# Patient Record
Sex: FEMALE | Race: OTHER | Employment: FULL TIME | URBAN - METROPOLITAN AREA
[De-identification: names, ages, dates, MRNs, and addresses within clinical notes are randomized per-mention and may not be internally consistent; named-entity substitution may affect disease eponyms.]

---

## 2019-04-04 ENCOUNTER — LAB ENCOUNTER (OUTPATIENT)
Dept: LAB | Age: 29
End: 2019-04-04
Attending: FAMILY MEDICINE
Payer: COMMERCIAL

## 2019-04-04 DIAGNOSIS — N92.0 MENORRHAGIA: ICD-10-CM

## 2019-04-04 DIAGNOSIS — Z00.00 ROUTINE GENERAL MEDICAL EXAMINATION AT A HEALTH CARE FACILITY: Primary | ICD-10-CM

## 2019-04-04 PROCEDURE — 36415 COLL VENOUS BLD VENIPUNCTURE: CPT

## 2019-04-04 PROCEDURE — 85025 COMPLETE CBC W/AUTO DIFF WBC: CPT

## 2019-04-04 PROCEDURE — 81001 URINALYSIS AUTO W/SCOPE: CPT

## 2019-04-04 PROCEDURE — 84443 ASSAY THYROID STIM HORMONE: CPT

## 2019-04-04 PROCEDURE — 80053 COMPREHEN METABOLIC PANEL: CPT

## 2019-05-02 ENCOUNTER — TELEPHONE (OUTPATIENT)
Dept: FAMILY MEDICINE CLINIC | Facility: CLINIC | Age: 29
End: 2019-05-02

## 2019-05-15 ENCOUNTER — TELEPHONE (OUTPATIENT)
Dept: OBGYN CLINIC | Facility: CLINIC | Age: 29
End: 2019-05-15

## 2019-05-15 ENCOUNTER — OFFICE VISIT (OUTPATIENT)
Dept: OBGYN CLINIC | Facility: CLINIC | Age: 29
End: 2019-05-15

## 2019-05-15 VITALS
WEIGHT: 120 LBS | BODY MASS INDEX: 22.08 KG/M2 | HEIGHT: 62 IN | SYSTOLIC BLOOD PRESSURE: 120 MMHG | DIASTOLIC BLOOD PRESSURE: 77 MMHG | HEART RATE: 67 BPM

## 2019-05-15 DIAGNOSIS — Z12.4 SCREENING FOR MALIGNANT NEOPLASM OF THE CERVIX: Primary | ICD-10-CM

## 2019-05-15 DIAGNOSIS — Z11.3 SCREEN FOR STD (SEXUALLY TRANSMITTED DISEASE): ICD-10-CM

## 2019-05-15 DIAGNOSIS — Z01.419 ENCOUNTER FOR GYNECOLOGICAL EXAMINATION WITHOUT ABNORMAL FINDING: ICD-10-CM

## 2019-05-15 DIAGNOSIS — N92.1 MENORRHAGIA WITH IRREGULAR CYCLE: ICD-10-CM

## 2019-05-15 PROCEDURE — 99213 OFFICE O/P EST LOW 20 MIN: CPT | Performed by: OBSTETRICS & GYNECOLOGY

## 2019-05-15 PROCEDURE — 99385 PREV VISIT NEW AGE 18-39: CPT | Performed by: OBSTETRICS & GYNECOLOGY

## 2019-05-15 NOTE — TELEPHONE ENCOUNTER
Pt was seen by CAP this morning. CAP did not put any notes in yet. Sanna at central scheduling informed we need to wait for CAP to chart before we can order anything.   Sanna states she told the pt this and she will call the pt once the orders are jacqueline

## 2019-05-16 NOTE — PROGRESS NOTES
Britany Castro is a 34year old female [id number] Patient's last menstrual period was 05/04/2019. Patient presents with:  Gyn Exam: new patient. ..annual   Menses have lasted for 2 weeks for the last 2 years.  She saw a previous MD for this but did not retu Active member of club or organization: Not on file        Attends meetings of clubs or organizations: Not on file        Relationship status: Not on file      Intimate partner violence:        Fear of current or ex partner: Not on file        Emotionally masses  Skin/Hair: no unusual rashes or bruises  Extremities: no edema, no cyanosis  Psychiatric:  Oriented to time, place, person and situation.  Appropriate mood and affect    Pelvic Exam:  External Genitalia: normal appearance, hair distribution, and no

## 2019-05-18 ENCOUNTER — LAB ENCOUNTER (OUTPATIENT)
Dept: LAB | Age: 29
End: 2019-05-18
Attending: OBSTETRICS & GYNECOLOGY
Payer: COMMERCIAL

## 2019-05-18 DIAGNOSIS — N92.1 MENORRHAGIA WITH IRREGULAR CYCLE: ICD-10-CM

## 2019-05-18 DIAGNOSIS — Z11.3 SCREEN FOR STD (SEXUALLY TRANSMITTED DISEASE): ICD-10-CM

## 2019-05-18 PROCEDURE — 84443 ASSAY THYROID STIM HORMONE: CPT

## 2019-05-18 PROCEDURE — 85027 COMPLETE CBC AUTOMATED: CPT

## 2019-05-18 PROCEDURE — 36415 COLL VENOUS BLD VENIPUNCTURE: CPT

## 2019-05-18 PROCEDURE — 87389 HIV-1 AG W/HIV-1&-2 AB AG IA: CPT

## 2019-05-18 PROCEDURE — 87340 HEPATITIS B SURFACE AG IA: CPT

## 2019-05-18 PROCEDURE — 86780 TREPONEMA PALLIDUM: CPT

## 2019-05-20 ENCOUNTER — TELEPHONE (OUTPATIENT)
Dept: OBGYN CLINIC | Facility: CLINIC | Age: 29
End: 2019-05-20

## 2019-05-20 NOTE — TELEPHONE ENCOUNTER
PT NOTIFIED OF RESULTS BELOW. SHE ALSO ASKED ABOUT THE OTHER TESTS AND ADVISED ALL TESTS DONE ON 5-18-19 WERE ALL NORMAL/NEGATIVE INCLUDING PAP.

## 2019-05-20 NOTE — TELEPHONE ENCOUNTER
----- Message from Peyton Ribeiro MD sent at 5/17/2019  7:27 AM CDT -----  Gc/chlamydia culture is negative

## 2019-05-23 ENCOUNTER — HOSPITAL ENCOUNTER (OUTPATIENT)
Dept: ULTRASOUND IMAGING | Facility: HOSPITAL | Age: 29
Discharge: HOME | End: 2019-05-23
Attending: OBSTETRICS & GYNECOLOGY
Payer: COMMERCIAL

## 2019-05-23 DIAGNOSIS — N92.1 MENORRHAGIA WITH IRREGULAR CYCLE: ICD-10-CM

## 2019-05-23 PROCEDURE — 76856 US EXAM PELVIC COMPLETE: CPT | Performed by: OBSTETRICS & GYNECOLOGY

## 2019-05-23 PROCEDURE — 76830 TRANSVAGINAL US NON-OB: CPT | Performed by: OBSTETRICS & GYNECOLOGY

## 2019-05-28 ENCOUNTER — TELEPHONE (OUTPATIENT)
Dept: OBGYN CLINIC | Facility: CLINIC | Age: 29
End: 2019-05-28

## 2019-05-28 NOTE — TELEPHONE ENCOUNTER
----- Message from Andres Diggs MD sent at 5/26/2019  7:08 PM CDT -----  Normal pelvic US with small L ovarian ovulation cyst that should resolve on it's own

## 2019-05-28 NOTE — TELEPHONE ENCOUNTER
Informed pt of results and CAP recs below. Pt asking if CAP sent OCP Rx to pharmacy. Informed pt message will be sent to CAP for recs on OCPs and once she replies we will inform pt. Pt verbalized understanding.

## 2019-05-29 NOTE — TELEPHONE ENCOUNTER
Pt informed of CAPs recs below and verbalized understanding. Pt advised to call our office after she sees PCP. Pt verbalized understanding.

## 2019-05-29 NOTE — TELEPHONE ENCOUNTER
Please see previous note, WBC count is slightly low- I would like for her to see her pcp about it before ocps are prescribed.   It may be just a normal variant but I would prefer to have that info first.

## 2019-06-07 ENCOUNTER — PATIENT MESSAGE (OUTPATIENT)
Dept: OBGYN CLINIC | Facility: CLINIC | Age: 29
End: 2019-06-07

## 2019-06-07 NOTE — TELEPHONE ENCOUNTER
From: Mago Trevino  To: Jeremy Ashford MD  Sent: 6/7/2019 2:15 PM CDT  Subject: Visit Follow-up Question    Hello, I have visited my primary doctor on June 3rd as instructed. He has stated my WBC is normal and there is no issue.  The visit is on [facility]

## 2019-06-13 RX ORDER — NORETHINDRONE ACETATE AND ETHINYL ESTRADIOL 1.5-30(21)
1 KIT ORAL DAILY
Qty: 3 PACKAGE | Refills: 0 | Status: SHIPPED | OUTPATIENT
Start: 2019-06-13 | End: 2019-09-16

## 2019-06-13 NOTE — TELEPHONE ENCOUNTER
As per my note we discussed that if her lab workup and US was normal that we would try to regulate her menses with ocps.   If she is ready to proceed with this then please send erx for junel fe 1.5/30 x 3 month supply then she needs to f/u with me when she

## 2019-06-13 NOTE — TELEPHONE ENCOUNTER
Pt informed of recs to start pill. Pt states she's had issues in the past with pills and her moods and CAP was going to pick one that would work well for her. Pt informed CAP is aware of her history and picked a pill for her. Pt's LMP was 6/6/19.   Pt in

## 2019-09-16 ENCOUNTER — OFFICE VISIT (OUTPATIENT)
Dept: OBGYN CLINIC | Facility: CLINIC | Age: 29
End: 2019-09-16

## 2019-09-16 VITALS — DIASTOLIC BLOOD PRESSURE: 60 MMHG | BODY MASS INDEX: 24 KG/M2 | WEIGHT: 130 LBS | SYSTOLIC BLOOD PRESSURE: 102 MMHG

## 2019-09-16 DIAGNOSIS — N92.1 MENORRHAGIA WITH IRREGULAR CYCLE: Primary | ICD-10-CM

## 2019-09-16 PROCEDURE — 99212 OFFICE O/P EST SF 10 MIN: CPT | Performed by: OBSTETRICS & GYNECOLOGY

## 2019-09-16 RX ORDER — NORETHINDRONE ACETATE AND ETHINYL ESTRADIOL 1.5-30(21)
1 KIT ORAL DAILY
Qty: 3 PACKAGE | Refills: 3 | Status: SHIPPED | OUTPATIENT
Start: 2019-09-16 | End: 2019-12-12 | Stop reason: DRUGHIGH

## 2019-09-16 NOTE — PROGRESS NOTES
Patt Meenurita    3/9/1990       Patient presents with:  Gyn Exam: Follow up to birth control    Pt had light bleeding during the first pack of pills then for the next 2 months she has had only 1 week of spotting the week before the placebo pills.   Pt

## 2019-12-04 ENCOUNTER — PATIENT MESSAGE (OUTPATIENT)
Dept: OBGYN CLINIC | Facility: CLINIC | Age: 29
End: 2019-12-04

## 2019-12-12 RX ORDER — NORETHINDRONE ACETATE AND ETHINYL ESTRADIOL AND FERROUS FUMARATE 1MG-20(24)
1 KIT ORAL DAILY
Qty: 28 TABLET | Refills: 2 | Status: SHIPPED | OUTPATIENT
Start: 2019-12-12 | End: 2019-12-14 | Stop reason: ALTCHOICE

## 2019-12-12 NOTE — TELEPHONE ENCOUNTER
Please send in erx for minastrin 24 x 3 months supply to see if this will decrease her flow.   She can start with her next pack

## 2019-12-13 ENCOUNTER — TELEPHONE (OUTPATIENT)
Dept: OBGYN CLINIC | Facility: CLINIC | Age: 29
End: 2019-12-13

## 2019-12-13 NOTE — TELEPHONE ENCOUNTER
Pt states [facility] does not carry OCP and was informed that OCP is discontinued. Pt asking for new OCP Rx to be sent. Message to CAP for new OCP Rx.

## 2019-12-14 RX ORDER — DROSPIRENONE AND ETHINYL ESTRADIOL 0.02-3(28)
1 KIT ORAL DAILY
Qty: 3 PACKAGE | Refills: 0 | Status: SHIPPED | OUTPATIENT
Start: 2019-12-14 | End: 2020-02-27

## 2020-02-23 ENCOUNTER — PATIENT MESSAGE (OUTPATIENT)
Dept: OBGYN CLINIC | Facility: CLINIC | Age: 30
End: 2020-02-23

## 2020-02-24 ENCOUNTER — PATIENT MESSAGE (OUTPATIENT)
Dept: OBGYN CLINIC | Facility: CLINIC | Age: 30
End: 2020-02-24

## 2020-02-24 NOTE — TELEPHONE ENCOUNTER
From: Maria Elena Captain  To: Katharyn Leyden. MD Kerline  Sent: 2/23/2020 8:43 PM CST  Subject: Prescription Question    Hello, I need a new birth control prescription. The perscription I am currently on did not work. My periods continue to be extremely long.  They

## 2020-02-25 NOTE — TELEPHONE ENCOUNTER
From: Sharon Bolaños  To: Luigi Steward. MD Kerline  Sent: 2/24/2020 6:32 PM CST  Subject: Prescription Question    Hello, I am replying to the previous message I received from Ambar Finnegan about an IUD and continuing the birth control pill I am currently on.   Wi

## 2020-02-25 NOTE — TELEPHONE ENCOUNTER
Message to CAP if OK to refill pts Myriam and if you want a 1 month or another 3 month supply? Or since pt is almost finished her with 3rd pack Myriam if you want to prescribe something else?  See pts my chart message below---pt stated she is not interested in an

## 2020-02-25 NOTE — TELEPHONE ENCOUNTER
Per pts chart---pt has already been on Junel Fe 1.5/30, Minastrin 24 Fe 1/20, and Myriam. Pt informed of CAPs recs via my chart.

## 2020-02-25 NOTE — TELEPHONE ENCOUNTER
Please investigate which pills she has already tried- she may also be a candidate to try a angelica IUD- please have her come back in for apt- 10 min appt- until then please have her continue the current pill as sometimes it takes 3-4 packs before the body be

## 2020-02-27 RX ORDER — DROSPIRENONE AND ETHINYL ESTRADIOL 0.02-3(28)
1 KIT ORAL DAILY
Qty: 3 PACKAGE | Refills: 0 | Status: SHIPPED | OUTPATIENT
Start: 2020-02-27

## 2020-02-27 NOTE — TELEPHONE ENCOUNTER
Ok to give pt a 3 months supply of Myriam and refill this one if she wants to continue with it until her next annual exam is due

## 2020-11-06 ENCOUNTER — LAB ENCOUNTER (OUTPATIENT)
Dept: LAB | Age: 30
End: 2020-11-06
Attending: FAMILY MEDICINE
Payer: COMMERCIAL

## 2020-11-06 DIAGNOSIS — N92.1 METRORRHAGIA: Primary | ICD-10-CM

## 2020-11-06 DIAGNOSIS — Z00.00 ROUTINE MEDICAL EXAM: ICD-10-CM

## 2020-11-06 PROCEDURE — 84703 CHORIONIC GONADOTROPIN ASSAY: CPT

## 2020-11-06 PROCEDURE — 80061 LIPID PANEL: CPT

## 2020-11-06 PROCEDURE — 80053 COMPREHEN METABOLIC PANEL: CPT

## 2020-11-06 PROCEDURE — 81001 URINALYSIS AUTO W/SCOPE: CPT

## 2020-11-06 PROCEDURE — 36415 COLL VENOUS BLD VENIPUNCTURE: CPT

## 2020-11-06 PROCEDURE — 85025 COMPLETE CBC W/AUTO DIFF WBC: CPT

## 2023-12-27 ENCOUNTER — HOSPITAL ENCOUNTER (EMERGENCY)
Facility: HOSPITAL | Age: 33
Discharge: HOME | End: 2023-12-27
Attending: EMERGENCY MEDICINE
Payer: COMMERCIAL

## 2023-12-27 ENCOUNTER — HOSPITAL ENCOUNTER (OUTPATIENT)
Age: 33
Discharge: ANOTHER HOSPITAL | End: 2023-12-27
Payer: COMMERCIAL

## 2023-12-27 ENCOUNTER — APPOINTMENT (OUTPATIENT)
Dept: ULTRASOUND IMAGING | Facility: HOSPITAL | Age: 33
End: 2023-12-27
Attending: EMERGENCY MEDICINE
Payer: COMMERCIAL

## 2023-12-27 VITALS
TEMPERATURE: 99 F | OXYGEN SATURATION: 100 % | DIASTOLIC BLOOD PRESSURE: 74 MMHG | SYSTOLIC BLOOD PRESSURE: 114 MMHG | HEART RATE: 68 BPM | RESPIRATION RATE: 16 BRPM

## 2023-12-27 VITALS
DIASTOLIC BLOOD PRESSURE: 68 MMHG | HEART RATE: 67 BPM | TEMPERATURE: 98 F | HEIGHT: 62 IN | RESPIRATION RATE: 18 BRPM | SYSTOLIC BLOOD PRESSURE: 99 MMHG | WEIGHT: 119 LBS | OXYGEN SATURATION: 98 % | BODY MASS INDEX: 21.9 KG/M2

## 2023-12-27 DIAGNOSIS — O20.9 VAGINAL BLEEDING IN PREGNANCY, FIRST TRIMESTER: Primary | ICD-10-CM

## 2023-12-27 DIAGNOSIS — O46.90 VAGINAL BLEEDING IN PREGNANCY: Primary | ICD-10-CM

## 2023-12-27 LAB
B-HCG SERPL-ACNC: ABNORMAL MIU/ML
B-HCG UR QL: POSITIVE
DEPRECATED RDW RBC AUTO: 40.3 FL (ref 35.1–46.3)
ERYTHROCYTE [DISTWIDTH] IN BLOOD BY AUTOMATED COUNT: 12 % (ref 11–15)
HCT VFR BLD AUTO: 37 %
HGB BLD-MCNC: 12.2 G/DL
MCH RBC QN AUTO: 30 PG (ref 26–34)
MCHC RBC AUTO-ENTMCNC: 33 G/DL (ref 31–37)
MCV RBC AUTO: 90.9 FL
PLATELET # BLD AUTO: 296 10(3)UL (ref 150–450)
RBC # BLD AUTO: 4.07 X10(6)UL
RH BLOOD TYPE: POSITIVE
WBC # BLD AUTO: 5.9 X10(3) UL (ref 4–11)

## 2023-12-27 PROCEDURE — 36415 COLL VENOUS BLD VENIPUNCTURE: CPT

## 2023-12-27 PROCEDURE — 99284 EMERGENCY DEPT VISIT MOD MDM: CPT

## 2023-12-27 PROCEDURE — 84702 CHORIONIC GONADOTROPIN TEST: CPT | Performed by: EMERGENCY MEDICINE

## 2023-12-27 PROCEDURE — 76817 TRANSVAGINAL US OBSTETRIC: CPT | Performed by: EMERGENCY MEDICINE

## 2023-12-27 PROCEDURE — 86901 BLOOD TYPING SEROLOGIC RH(D): CPT | Performed by: EMERGENCY MEDICINE

## 2023-12-27 PROCEDURE — 86900 BLOOD TYPING SEROLOGIC ABO: CPT | Performed by: EMERGENCY MEDICINE

## 2023-12-27 PROCEDURE — 85027 COMPLETE CBC AUTOMATED: CPT | Performed by: EMERGENCY MEDICINE

## 2023-12-27 PROCEDURE — 81025 URINE PREGNANCY TEST: CPT

## 2023-12-27 PROCEDURE — 76801 OB US < 14 WKS SINGLE FETUS: CPT | Performed by: EMERGENCY MEDICINE

## 2023-12-27 PROCEDURE — 99203 OFFICE O/P NEW LOW 30 MIN: CPT

## 2023-12-27 PROCEDURE — 99285 EMERGENCY DEPT VISIT HI MDM: CPT

## 2023-12-27 NOTE — ED INITIAL ASSESSMENT (HPI)
Patient is 8 weeks pregnant. Onset of vaginal bleeding at 9am today. States \"a lot of clots\" when taking a shower PTA. Denies abdominal pain or cramping.

## 2023-12-27 NOTE — ED QUICK NOTES
Pt verbalizes understanding of discharge paperwork including follow-up care. Pt NAD, VSS, a/o x4, ambulatory with steady gait.

## 2023-12-27 NOTE — ED QUICK NOTES
Pt states she began bleeding this morning and passed a clot the size of her palm. Pt states she is not currently bleeding when she uses the bathroom. Pt denies any bleeding with any of her other pregnancies.

## 2023-12-27 NOTE — ED INITIAL ASSESSMENT (HPI)
Patient presents to ER with complaints of vaginal bleeding since this morning. Patient from [address] [place] [address], is approximately 8 weeks pregnant. ; denies any pain.    Notes bleeding into toilet, and admits passing large blood clot, notes no significant bleeding at this time in ER

## 2024-03-18 ENCOUNTER — TELEPHONE (OUTPATIENT)
Dept: OBGYN CLINIC | Facility: CLINIC | Age: 34
End: 2024-03-18

## 2024-03-19 NOTE — TELEPHONE ENCOUNTER
Name and  verified    Records in chart from Dr. Boyle and group. Denies complications with this pregnancy including high blood pressure or diabetes. Scheduled for a meet and greet.

## 2024-03-21 ENCOUNTER — OFFICE VISIT (OUTPATIENT)
Dept: OBGYN CLINIC | Facility: CLINIC | Age: 34
End: 2024-03-21
Payer: COMMERCIAL

## 2024-03-21 DIAGNOSIS — Z71.89 PRENATAL CONSULT: Primary | ICD-10-CM

## 2024-03-21 NOTE — PROGRESS NOTES
Rose is 34 year old,  at 20w4d by 7w US here for Meet & Greet.  She is currently being seen by the [facility] but is interested in midwifery care. Wants to know if we would be ok with her going past her due date as her 2 other children were born a few days after the due date. Her second baby was 9lb6oz. Reports fast deliveries with both. Denies any hx HTN, pre-eclampsia, gestational diabetes. Denies shoulder dystocia history or any other birth complications. Reports she tore and was cut with both but doesn't know how bad. Denies any medical history or medical problems.   Pt reports she is up to date on her prenatal care and has her anatomy US scheduled in 2 weeks. Records not visible in care everywhere other than 23 missed menses visit. Pt may transfer if she desires as long as records are consistent with her history once we receive them. She agrees to a 3rd trimester growth US and GDM screening.  CNM Scope of care and philosophy reviewed.  If desires, she should schedule an RN Ed visit then OB visit to follow, next available. Discussed importance of continuing prenatal care with her current provider until she has her first CNM visit to prevent any gaps in care.  Patient voiced understanding.

## 2024-04-04 ENCOUNTER — NURSE ONLY (OUTPATIENT)
Dept: OBGYN CLINIC | Facility: CLINIC | Age: 34
End: 2024-04-04

## 2024-04-04 DIAGNOSIS — Z34.82 ENCOUNTER FOR SUPERVISION OF OTHER NORMAL PREGNANCY IN SECOND TRIMESTER (HCC): Primary | ICD-10-CM

## 2024-04-04 RX ORDER — CHOLECALCIFEROL (VITAMIN D3) 25 MCG
1 TABLET,CHEWABLE ORAL DAILY
COMMUNITY

## 2024-04-04 NOTE — PROGRESS NOTES
Pt called today for RN OB Education.   LMP: 10/24/2023    Pre  BMI: 20.85    EPDS score: 0/30    Working PRUDENCIO: 2024  Hx of genetic abnormality in family: Denies  Hx of varicella: Chicken pox  Flu Vaccine: none  Covid Vaccine: none  Special diets? None  Epidural- Declined  Midwives- online research       OUD Screening: Pt. Has answered NO 5P questions and has NO  risk factors.    Pt. Given What pregnant women need to know handout.       SDOH Screening: Low risk     Educational material reviewed with patient: Prenatal care, nutrition, weight gain recommendations, travel, exercise, intercourse, pregnancy changes, safe medications, pregnancy and work, fetal movement, labor and  labor, warning signs, food safety, tdap, cord blood, breastfeeding, circumcision, and Group B strep.     Blood transfusion if needed: Consents     PN labs: Already completed- Added A1c and HgB Electrophoresis      Optional genetic screening labs were reviewed: Cell FreeDNA, FTS with US, Quad screen MSAFP and CF screening.   - Already completed.  [facility] Media Policy: Discussed     Patient has appt  for Anatomy Scan, will fax us over ultrasound report.     NOB apt:  4/10 TM

## 2024-04-09 ENCOUNTER — TELEPHONE (OUTPATIENT)
Dept: OBGYN CLINIC | Facility: CLINIC | Age: 34
End: 2024-04-09

## 2024-04-09 NOTE — TELEPHONE ENCOUNTER
Patient request a nurse to call to confirm ultrasound results were received.  Patient had results sent from [facility]. Please advise

## 2024-04-09 NOTE — TELEPHONE ENCOUNTER
Name and  verified    Patient informed fax has not been received, and will call to ask to refax.

## 2024-04-10 ENCOUNTER — INITIAL PRENATAL (OUTPATIENT)
Dept: OBGYN CLINIC | Facility: CLINIC | Age: 34
End: 2024-04-10
Payer: COMMERCIAL

## 2024-04-10 VITALS
SYSTOLIC BLOOD PRESSURE: 99 MMHG | HEART RATE: 66 BPM | BODY MASS INDEX: 25 KG/M2 | WEIGHT: 135 LBS | DIASTOLIC BLOOD PRESSURE: 67 MMHG

## 2024-04-10 DIAGNOSIS — R10.2 PELVIC PAIN AFFECTING PREGNANCY IN SECOND TRIMESTER, ANTEPARTUM (HCC): ICD-10-CM

## 2024-04-10 DIAGNOSIS — O26.892 PELVIC PAIN AFFECTING PREGNANCY IN SECOND TRIMESTER, ANTEPARTUM (HCC): ICD-10-CM

## 2024-04-10 DIAGNOSIS — Z34.82 PRENATAL CARE, SUBSEQUENT PREGNANCY IN SECOND TRIMESTER (HCC): Primary | ICD-10-CM

## 2024-04-10 PROBLEM — Z34.90 PREGNANCY (HCC): Status: ACTIVE | Noted: 2024-04-10

## 2024-04-10 NOTE — PATIENT INSTRUCTIONS
Comfort Tips During Pregnancy    Talk with your healthcare provider before using pain-relieving medicine at any time during your pregnancy.  First trimester tips  Nausea  Get up slowly. Eat a few unsalted crackers before you get out of bed.  Avoid smells that bother you.  Eat small bland low fat, light high-protein meals at frequent intervals.  Sip on water, weak tea, or clear soft drinks, like ginger ale. Eat ice chips.  Fatigue  Take catnaps when you can.  Get regular exercise.  Accept help from others.  Practice good sleep habits, like going to bed and getting up at the same time each day. Use your bed only for sleep and sex.  Mood swings  Talk about your feelings with others, including other mothers.  Limit sugar, chocolate, and caffeine.  Eat a healthy diet. Don’t skip meals.  Get regular exercise.  Headaches  Get fresh air and exercise.  Relax and get enough rest.  Check with your healthcare provider before taking any pain medicines.  Second trimester tips  Here are some suggestions to help you cope:  To limit ankle swelling, sit with your feet raised or wear support hose.  If you have pain in your groin and stomach (round ligament pain), avoid sudden twisting movements.  For leg cramps, flexing your foot often brings immediate relief. You also may try massaging your calf in long, downward strokes, or stretching your legs before going to bed. Get enough exercise and wear shoes with flexible soles.  Third trimester tips  Reducing heartburn  Eat small, light meals throughout the day rather than 3 large ones.  Sleep with your upper body raised 6 inches. Don’t lie down until 2 hours after you eat.  Don't eat greasy, fried, or spicy foods.  Avoid citrus fruits and juices.  Treating constipation  Eat foods high in fiber (whole-grain foods, fresh fruit and vegetables).  Drink plenty of water.  Get regular exercise.  Discuss other medicines (like docusate and psyllium) with your healthcare provider.  Taking care  of your breasts  Avoid using harsh soaps or alcohol, which can cause excessive dryness.  Wear nursing bras. They provide more support than regular bras and can be used after pregnancy if you breastfeed.  Getting a good night’s sleep  Take a warm shower before bed.  Sleep on a firm mattress.  Lie on your side with 1 leg crossed over the other.  Use pillows to support arms, legs, and belly.  [employer] last reviewed this educational content on 10/1/2017  © 0608-7539 [employer]. [address] [place] [address]. All rights reserved. This information is not intended as a substitute for professional medical care. Always follow your healthcare professional's instructions.        Adapting to Pregnancy: Second Trimester    Keep up the healthy habits you started in your first trimester. You might be a little more tired than normal. So plan your day wisely. Look at the tips below and choose the ones that suit your lifestyle.  If you have any questions, check with your healthcare provider.  If you work  If you can, adjust your work with your employer to fit your needs. Try these tips:  If you stand for long periods, find ways to do some tasks while sitting. Also, try to stand with 1 foot resting on a low stool or ledge. Shift your weight from foot to foot often. Wear low-heeled shoes.  If you sit, keep your knees level with your hips. Rest your feet on a firm surface. Sit tall with support for your low back.  If you work long hours, ask about adjusting your schedule. Try taking shorter breaks more often.  When you travel  The second trimester may be the best time for any travel. Talk to your healthcare provider about any special plans you may need to make. Always:  Wear a seat belt. Fasten the lap part under your belly. Wear the shoulder part also.  Take breaks often during long trips by car or plane. Move around to stretch your legs.  Drink plenty of fluids on flights. The air in plane cabins is very  dry.  Avoid hot climates or high altitudes if you are not used to them.  Avoid places where the food and water might make you sick.  Make sure you are up-to-date on all immunizations, including the flu vaccine. This is especially important when traveling overseas.  Taking time to relax  Find time to rest and relax at work or at home:  Take short time-outs daily. Do relaxation exercises.  Breathe deeply during stressful times.  Try not to take on too much. Plan tasks for times when you have the most energy.  Take naps when you can. Or just sit and relax.  After week 16, avoid lying on your back for more than a few minutes. Instead, lie on your side. Switch sides often.  Continuing as lovers  Unless your healthcare provider tells you otherwise, there is no reason to stop having sex now. Blood supply increases to the pelvic area in the second trimester. Because of this, sex might be more enjoyable. Try different positions and see what’s best. Also, talk to your partner about any changes in desire. Spotting may happen after sex. Be sure to let your healthcare provider know if there is heavy bleeding.  Keeping your environment safe  You can still clean house and use scented products. Just take some simple precautions:  Wear gloves when using cleaning fluids.  Open windows to let in fresh air. Use a fan if you paint.  Avoid secondhand smoke.  Don’t breathe fumes from nail polish, hair spray, cleansers, or other chemicals.  [employer] last reviewed this educational content on 1/1/2018  © 1546-5771 [employer]. [address] [place] [address], [place] [id number]. All rights reserved. This information is not intended as a substitute for professional medical care. Always follow your healthcare professional's instructions.        Pregnancy: Your Second Trimester Changes  Each day, you and your baby are changing and growing together. Here’s a quick look at what’s happening to both of you.  How you are changing  Even when you  don’t notice it, your body is adapting to meet the needs of your growing baby. The changes in your body might also affect your moods.  Your body  Your uterus expands as baby grows. As the weeks go by, you will feel more pressure on your bladder, stomach, and other organs. You may notice some skin color changes on your forehead, nose, or cheeks. Freckles may darken, and moles may grow. You may notice a darker line on your abdomen between your belly button and pubic bone in the midline.  Your moods  The second trimester is often easier than the first. Still, be prepared for mood swings. These are due to the increase in hormones (chemicals that affect the way organs work) produced by your body. These mood swings are a normal part of pregnancy.  How your baby is growing          Month 4  Baby’s heartbeat may be heard with a Doppler (hand-held ultrasound device) by 9 to 10 weeks. Eyebrows, eyelashes, and fingernails begin to form.  Month 5  You may feel your baby move. After a growth spurt, your baby nears 10 inches. Month 6  Baby’s fingerprints have formed. Your baby weighs about 1 to 2 pounds and is about 12 inches long.   [employer] last reviewed this educational content on 2018  © 4407-3229 [employer], [employer]. [address] [place] [address], [place] [address]. All rights reserved. This information is not intended as a substitute for professional medical care. Always follow your healthcare professional's instructions.   Understanding  Labor  Going into labor before your 37th week of pregnancy is called  labor.  labor can cause your baby to be born too soon. This can lead to a number of health problems that may affect your baby.      Before labor, the cervix is thick and closed.      In  labor, the cervix begins to efface (thin) and dilate (open).   Symptoms of  labor   If you think you’re having  labor, get medical help right away. Contractions alone don’t mean you’re in   labor. What matters more are changes in your cervix (the lower end of the uterus). Symptoms of  labor include:   Four or more contractions per hour  Strong contractions  Constant menstrual-like cramping  Low-back pain  Mucous or bloody vaginal discharge  Bleeding or spotting in the second or third trimester  Evaluating  labor   Your healthcare provider will try to find out whether you’re in  labor or whether you’re just having contractions. He or she may watch you for a few hours. The following tests may be done:   Pelvic exam to see if your cervix has effaced (thinned) and dilated (opened)  Uterine activity monitoring to detect contractions  Fetal monitoring to check the health of your baby  Ultrasound to check your baby’s size and position  Amniocentesis to check how mature your baby’s lungs are  Caring for yourself at home   If you have  contractions, but your cervix is still thick and closed, your healthcare provider may ask you to do the following at home:   Drink plenty of water.  Do fewer activities.  Rest in bed on your side.  Don't have intercourse or nipple stimulation.  When to call your healthcare provider   Call your healthcare provider if you notice any of these:   Four or more contractions per hour  Bag of water breaks  Bleeding or spotting  If you need hospital care    labor often requires that you have hospital care and complete bed rest. You may have an IV (intravenous) line to get fluids. You may be given pills or injections to help prevent contractions. Finally, you may get medicine (corticosteroids) that helps your baby’s lungs mature more quickly.   Are you at risk?   Any pregnant woman can have  labor. It may start for no reason. But these risk factors can increase your chances:   Past  labor or past early birth  Smoking, drug, or alcohol use during pregnancy  Multiple fetuses (twins or more)  Problems with the shape of the  uterus  Bleeding during the pregnancy  The dangers of  birth   A baby born too soon may have health problems. This is because the baby didn’t have enough time to mature. Some of the risks for your baby include:   Not breastfeeding or feeding well  Having immature lungs  Bleeding in the brain  Dying  Reaching term   Your goal is to get as close to term as you can before giving birth. The closer you get to term, the higher your chance of having a healthy baby. Work with your healthcare provider. Together, you can take steps that may keep you from giving birth too early.   [employer] last reviewed this educational content on 3/1/2019  © 7226-4624 [employer]. [address] [place] [address], [place] [address]. All rights reserved. This information is not intended as a substitute for professional medical care. Always follow your healthcare professional's instructions.        Premature Labor    Premature labor ( labor) is when symptoms of labor occur before 37 weeks of pregnancy. (This is 3 weeks before your due date.) Premature labor can lead to premature delivery. This means giving birth to your baby early. Babies need at least 37 weeks of pregnancy for all the organs to develop normally. The earlier the delivery, the greater the risks to the baby.  In most cases, the cause of premature labor is unknown. But certain factors may make the problem more likely. These include:  History of premature labor with other pregnancies  Smoking  Alcohol or substance abuse  Low pre-pregnancy weight or weight gain during pregnancy  Short time period between pregnancies  Being pregnant with twins, triplets, or more  History of certain types of surgery on the cervix or uterus  Having a short cervix  Certain infections  There are a number of other risk factors. Ask your healthcare provider to help you understand the risk factors specific to your case. Then find out what you can do to control or reduce them.  Contractions  are one of the main signs of premature labor. A contraction is different from cramping. It may feel painful and the belly (abdomen) may get hard. It can last from a few seconds to a few minutes. Some women may feel only a sense of pressure in the belly, thighs, rectum, or vagina. Some may feel only the hardening of the uterus without pain or pressure. Or there may be a constant pain in the lower back, which spreads forward toward the belly.Premature labor is often treated with medicines. A hospital stay may be needed. If labor doesn't progress and you and your baby are both healthy, you may be discharged to continue care at home.  Home care  Ask your provider any questions you have. Be certain you understand how to care for yourself at home. Also follow all recommendations given by your healthcare providers.  Learn the signs of premature labor. Watch for these signs when you get home.  Limit or restrict activities as advised. This may include stopping certain physical activities and cutting back hours at work.  Avoid doing strenuous work as directed by your provider. Ask family and friends for help with tasks and support at home, if needed.  Don’t smoke, drink alcohol, or use other harmful substances.  Take steps to reduce stress.  Report any unusual symptoms to your provider.    Follow-up care  Follow up with your healthcare provider, or as directed. Weekly visits with your provider may be needed.  When to seek medical advice  Call your healthcare provider right away if any of these occur:  Regular or frequent contractions, whether they are painful or not  Pressure in the pelvis  Pressure in the lower belly or mild cramping in your belly with or without diarrhea  Constant low, dull backache  Gush or slow leaking of water from your vagina  Change in vaginal discharge (watery, mucus, or bloody)  Any vaginal bleeding  Decreased movement of your baby  [employer] last reviewed this educational content on 6/1/2018  ©  8980-6932 [employer]. [address] [place] [address]. All rights reserved. This information is not intended as a substitute for professional medical care. Always follow your healthcare professional's instructions.        Understanding Preeclampsia  Preeclampsia is high blood pressure (hypertension) that happens during pregnancy. It often shows up around the 20th week of pregnancy. It often goes back to normal by the 12th week after you give birth. It can lead to serious health risks for you and your baby. During your pregnancy, your healthcare provider will watch your blood pressure.    Symptoms  A common symptom of preeclampsia is high blood pressure. Other symptoms may include:  Rapid weight gain  Protein in your urine  Headache  Belly (abdominal) pain on your right side  Vision problems. These include flashes or spots.  Swelling (edema) in your face or hands. This also often happens near the end of normal pregnancies, even without preeclampsia.  Tests you may have  Your healthcare provider will want to check your blood pressure throughout your pregnancy. If your blood pressure is high, you may have the following tests:  Urine tests to look for protein  Blood tests to confirm preeclampsia  Fetal monitoring to make sure that your baby is healthy  Treating preeclampsia  You may need to take a daily low dose of aspirin if you are at risk for preeclampsia. Preeclampsia almost always ends soon after you give birth. Until then, your healthcare provider can help manage your condition. If your symptoms are mild, you may need activity limits at home, including bed rest and no heavy lifting. If your symptoms are severe, you will stay in the hospital. Hospital treatment includes:  Activity limits to help control blood pressure. This means no heavy lifting and 8 hours per day lying down with the feet up.  Magnesium IV (intravenous) drip during labor to prevent seizures  Induced labor or surgical  delivery by  section. Delivery is considered the cure for preeclampsia.  When to call your healthcare provider  Call your healthcare provider if swelling, weight gain, or other symptoms come on quickly or are severe. Some cases of preeclampsia are more severe than others. Your symptoms also may change or get worse as you get closer to your due date.  Who’s at risk?  No one knows what causes preeclampsia. Preeclampsia can happen in any pregnant woman. But it is more common in first-time pregnancies. Things that increase the risk include:  Previous pregnancies. You are at risk if you had preeclampsia, intrauterine growth retardation (IUGR),  birth, placental abruption, or fetal death in a past pregnancy.  Health history of mother. You are at risk if you have diabetes, high blood pressure, obesity, kidney disease, autoimmune disease such as lupus, or a family history of preeclampsia.  Current pregnancy. You are at risk if this is your first pregnancy, or if you have multiple fetuses, are younger than age 18 or older than 40, or used in vitro fertilization.  Race. You are at risk if you are black.  Dangers of preeclampsia  If not treated, preeclampsia can cause problems for you and your baby. The placenta is the organ that nourishes your baby. It may tear away from the uterine wall. This can put the baby at risk for health problems (fetal distress) and premature birth. Preeclampsia can also cause these health problems:  Kidney failure or other organ damage  Seizures  Stroke  Once you give birth  In most cases, preeclampsia goes away on its own soon after you give birth. This is often by the 12th week after you give deliver. Within days of delivery, your blood pressure, swelling, and other symptoms should get better. For some women, problems from preeclampsia can continue after delivery.  Postpartum preeclampsia that develops within the first 48 hours after delivery is rare. Another type of postpartum  preeclampsia that develops more than 48 hours after delivery is called late-onset preeclampsia. It is also rare. Contact your healthcare provider right away if you have symptoms of preeclampsia after you deliver.  [employer] last reviewed this educational content on 12/1/2019 © 2000-2020 [employer]. [address] [place] [address], [place] [address]. All rights reserved. This information is not intended as a substitute for professional medical care. Always follow your healthcare professional's instructions.        Kick Counts    It’s normal to worry about your baby’s health. One way you can know your baby’s doing well is to record the baby’s movements once a day. This is called a kick count. Remember to take your kick count records to all your appointments with your healthcare provider.  How to count kicks  Time how long it takes you to feel 10 kicks, flutters, swishes, or rolls. Ideally, you want to feel at least 10 movements within 2 hours. You will likely feel 10 movements in less time than that.  Starting at 28 weeks, count your baby's movements daily. Follow your healthcare provider's instructions for kick counting. Here are tips for counting kicks:  Choose a time when the baby is active, such as after a meal.   Sit comfortably or lie on your side.   The first time the baby moves, write down the time.   Count each movement until the baby has moved 10 times. This can take from 20 minutes to 2 hours.   If you have not felt 10 kicks by the end of the second hour, wait a few hours. Then try again.  Try to do it at the same time each day.  When to call your healthcare provider  Call your healthcare provider right away if:  You do a couple sets of kick counts during the day and your baby moves fewer than 10 times in 2 hours  Your baby moves much less often than on the days before.  You have not felt your baby move all day.  [employer] last reviewed this educational content on 12/1/2017 © 2000-2020 The [employer]  [employer]. [address] [place] [address]. All rights reserved. This information is not intended as a substitute for professional medical care. Always follow your healthcare professional's instructions. Back Pain During Pregnancy  As your body changes during pregnancy, your back must work in new ways. This can be painful if your back isn’t prepared. Back pain is due to many causes. Physical changes to your body can strain your back and its supporting muscles. Also, certain hormone levels (chemicals that carry messages throughout the body) increase during pregnancy. This can affect how the muscles and joints work together. All of these changes can lead to pain.  Your back  The spine is the column of bones that runs down your back. It has three curves: the cervical, thoracic, and lumbar. These curves support your body and help you keep your balance. Muscles in your back and abdomen (stomach) brace and support the spine. Muscles in your buttocks, pelvis, and thighs work with your spine to let you twist, bend, and lift.  Your back during pregnancy  During pregnancy, changes in your body affect your back and posture (how you position your body). Your body’s shape and size change, making your muscles work harder. As the body prepares for childbirth, hormones cause pelvic muscles, ligaments, and joints to loosen. This can lead to pain. These changes may also cause you to use poor posture (positions that strain the spine). Over time, poor posture often results in back pain. Talk with your healthcare provider about whether a maternity support belt might help relieve some of the pressure and pain in your lower back.      [employer] last reviewed this educational content on 12/1/2017  © 5870-5511 The [employer]. [address] [place] [address]. All rights reserved. This information is not intended as a substitute for professional medical care. Always follow your healthcare professional's  instructions.    Back Pain During Pregnancy: Moving Safely  Learning the proper ways to bend, lift, and carry objects may help relieve back strain. It will also help you protect your back after your baby is born. Remember, if you’re having trouble protecting your back, it’s OK to ask the people around you for help!       Bending Lifting Carrying   Bending  To protect your back as you bend:  Put 1 foot slightly in front of the other. Bend at the knees and hips, pushing your hips backward. Keep your upper body as straight as you can.  Face forward. Try to keep your ears, shoulders, and hips in a line.  Don’t hold your breath.  Lifting  To lift a large object or a child:  Get as close to the load as you can. Face forward, to help keep your ears and shoulders aligned.  Use the muscles in your thighs and buttocks to stand up. As you lift, tighten your stomach and pelvic floor muscles.  Don’t hold your breath. Avoid twisting.  Carrying  To carry a load safely:  Carry an object or child in front of you, not resting on your hip.  Break up your load into 2 smaller bags, if you can. Carry 1 bag on each side to maintain balance. Or, break the load into smaller ones and take more trips.  Try to tighten your stomach and pelvic floor muscles as you walk. This helps take weight off your back.  [employer] last reviewed this educational content on 2/1/2018 © 2000-2020 [employer]. [address] [place] [address], [place] [address]. All rights reserved. This information is not intended as a substitute for professional medical care. Always follow your healthcare professional's instructions.    Back Pain During Pregnancy: Positioning Yourself     When standing, resting a foot on a low block can ease back pain.   You likely position yourself differently now than you did before you were pregnant. Did you know that standing, sitting, or lying in certain ways can lead to back pain? To ease pain, use positions that support your body  comfortably.   Tips for good posture  Using good posture means holding yourself so that your spine is aligned and your muscles can work without strain. To use good posture:  Raise your chest and head. Try to keep your ears lined up over your shoulders.  Use your stomach muscles to pull in your stomach. This reduces the amount of weight your back must support.  Keep your pelvis level. Think of your pelvis as a bowl of water that will spill if it tips too far forward or backward.  Standing  If you must stand for long periods, try to change positions every 15 minutes. This gives your muscles a break. When standing, also:  Keep your legs slightly apart. This helps you balance your weight.  Rest one foot on a book, ledge, or low stool. Every few minutes, switch legs.  Wear comfortable shoes with padded soles and arch support, like athletic shoes.  Sitting  When sitting in a chair or car, make sure your spine’s lumbar curve is supported. Use a chair with lumbar support built in, or put a firm pillow against your lower back. Also try the following:  Sit with your knees slightly lower than your hips. Don’t cross your legs.  Take deep breaths often. This helps keep your spine and stomach in the best position.  Vary your activity each hour. For instance, get up from your desk and take a 5-minute walk around the office.  Lying down  To lie safely and comfortably:  Lie on your side with your knees slightly bent. This takes pressure off your uterus and improves blood flow to your baby.  Place pillows under your abdomen and between your knees.  To get out of bed, roll onto your side. Use your arms to push yourself into a seated position. Scoot to the edge of the bed and place your feet on the floor. Lean forward, then use your leg muscles to stand.  Consider investing in a firm mattress.  Lifting  Tip to safely lift:  Do not bend over from the waist to pick things up-squat down, bend your knees, and keep your back  straight.  [employer] last reviewed this educational content on 2/1/2018  © 6530-1670 [employer]. [address] [place] [id number]. All rights reserved. This information is not intended as a substitute for professional medical care. Always follow your healthcare professional's instructions.    Pelvic Tilt, Leg Lift for Back Pain During Pregnancy  Before trying these exercises, talk to your healthcare provider to make sure they are safe for you. Ask your healthcare provider how many times to do each exercise.      Pelvic tilt Leg lift   Pelvic tilt  This exercise stretches muscles in your buttocks and lower back. It also strengthens your stomach and helps set up good posture.  Get on your hands and knees with your back straight. A mat can help cushion your knees.  Try to pull your stomach muscles in. Tuck in your buttocks. This will tilt your pelvis up. As your pelvis tilts, your back will rise toward the ceiling.  Hold and count to 5, then relax.  Leg lifts  This strengthens the muscles of your back, buttocks, and stomach.  Get down on your hands and knees. Put your arms directly under your shoulders. Keep your knees shoulder-width apart.  Round your back. Then lift your left knee and gently bring it toward your elbow. Look at your knee as you raise it. (Stop moving your knee if you feel pressure in your stomach.)  Keeping your knee slightly bent, extend your leg. Lift your leg until you feel a stretch in your low back. Don’t lift your leg higher than your hip.  Hold for 5 counts, then lower your left leg. Repeat the exercise with your right leg.  [employer] last reviewed this educational content on 2/1/2018  © 3533-7414 [employer]. [address] [place] [address]. All rights reserved. This information is not intended as a substitute for professional medical care. Always follow your healthcare professional's instructions.    Tailor Sit, Trunk Turn for Back Pain During  Pregnancy  Before trying these exercises, talk to your healthcare provider to make sure they are safe for you. Ask your healthcare provider how many times to do each exercise.      Tailor sit Trunk turns   Tailor sit  This exercise makes your thigh, pelvic, and hip muscles more flexible.  Sit on the floor with the soles of your feet together. Your back should be straight.  Gently lean forward until you feel a mild stretch in your hip and thigh muscles. Your back should remain straight. Don’t push down on your legs with your hands.  Hold and count to 5, then relax.  Trunk turns  This helps make your trunk (from your shoulders to your hips) more flexible.  Sit on the floor with your legs crossed. Your back should be straight.  Put your left hand on your right knee. Rest your right hand on the floor to support yourself and help you balance.  Slowly twist right. To do this, turn your head, shoulders, and chest as far right as you comfortably can. Keep your hips, knees, and feet in place.  Hold for 5 counts. Then change sides and slowly twist left.  [employer] last reviewed this educational content on 2/1/2018 © 2000-2020 [employer]. [address] [place] [address]. All rights reserved. This information is not intended as a substitute for professional medical care. Always follow your healthcare professional's instructions.    Relieving Back Pain During Pregnancy: Wall Stretch, Body Bend  Before trying these exercises, talk to your healthcare provider to make sure they are safe for you. Ask your healthcare provider how many times to do each exercise.      Wall stretch Body bend   Wall stretch  This strengthens and loosens the muscles in your upper back:  Lean against a wall with a firm pillow or rolled towel under your shoulder blades. Your feet should be about 12 inches from the wall and shoulder-width apart. Point your chin down.  Breathe in. Push your shoulders, neck, and head against the wall.  You will feel a stretch in your shoulders.  Hold for 5 counts. Then breathe out, and relax your shoulders and neck.  Body bend  This strengthens your back and buttocks muscles:  Stand with your legs shoulder-width apart. Put your hands on your upper thighs and bend your knees slightly.  Slowly bend forward at the hips. Push your hips back and keep your shoulders up. Make sure your back is straight. You’ll feel a stretch in your upper thighs. You’ll also feel your back muscles holding you in position.  Hold for 5 counts, then straighten.  [employer] last reviewed this educational content on 2/1/2018 © 2000-2020 [employer]. [address] [place] [address], [place] [address]. All rights reserved. This information is not intended as a substitute for professional medical care. Always follow your healthcare professional's instructions.

## 2024-04-10 NOTE — PROGRESS NOTES
Rose, , is at 23w5d, here for her NOB visit/Transfer of care from [facility].  Currently, she is feeling well. Denies 2nd trimester danger signs.   Has some cavities and wants to know if she can go to the dentist, have x-rays, and lidocaine, etc.  Also, notes numbness in her left pubic region when ambulating.     Vital signs and weight reviewed  See flowsheets  Anatomy scan WNL and reviewed with patient then sent for scan    Assessment/Plan: 1hr GTT and CBC ordered for next visit  Dental caries: Urged to complete routine dental care and letter provided for dentist  Pubic pain, likely sciatica or nerve pain from low back strain: Exercises provided. Can have PT referral or go to chiro if symptoms persist. To call if worsens prior to next visit.  Next visit: 4 weeks    Reviewed:   Prenatal visit schedule  Kick counts  Danger signs  CNM care in labor    Pt verbalized understanding. All questions answered. No barriers to learning identified

## 2024-05-08 ENCOUNTER — ROUTINE PRENATAL (OUTPATIENT)
Dept: OBGYN CLINIC | Facility: CLINIC | Age: 34
End: 2024-05-08
Payer: COMMERCIAL

## 2024-05-08 ENCOUNTER — LAB ENCOUNTER (OUTPATIENT)
Dept: LAB | Facility: REFERENCE LAB | Age: 34
End: 2024-05-08
Attending: ADVANCED PRACTICE MIDWIFE
Payer: COMMERCIAL

## 2024-05-08 VITALS
WEIGHT: 140.63 LBS | SYSTOLIC BLOOD PRESSURE: 105 MMHG | DIASTOLIC BLOOD PRESSURE: 69 MMHG | HEART RATE: 71 BPM | BODY MASS INDEX: 26 KG/M2

## 2024-05-08 DIAGNOSIS — Z34.82 ENCOUNTER FOR SUPERVISION OF OTHER NORMAL PREGNANCY IN SECOND TRIMESTER (HCC): ICD-10-CM

## 2024-05-08 DIAGNOSIS — Z34.83 PRENATAL CARE, SUBSEQUENT PREGNANCY IN THIRD TRIMESTER (HCC): Primary | ICD-10-CM

## 2024-05-08 DIAGNOSIS — Z34.82 PRENATAL CARE, SUBSEQUENT PREGNANCY IN SECOND TRIMESTER (HCC): ICD-10-CM

## 2024-05-08 DIAGNOSIS — Z28.21 TETANUS, DIPHTHERIA, AND ACELLULAR PERTUSSIS (TDAP) VACCINATION DECLINED: ICD-10-CM

## 2024-05-08 LAB
DEPRECATED RDW RBC AUTO: 44.1 FL (ref 35.1–46.3)
ERYTHROCYTE [DISTWIDTH] IN BLOOD BY AUTOMATED COUNT: 12.8 % (ref 11–15)
EST. AVERAGE GLUCOSE BLD GHB EST-MCNC: 97 MG/DL (ref 68–126)
GLUCOSE 1H P GLC SERPL-MCNC: 104 MG/DL
HBA1C MFR BLD: 5 % (ref ?–5.7)
HCT VFR BLD AUTO: 36 %
HGB BLD-MCNC: 11.8 G/DL
MCH RBC QN AUTO: 31.7 PG (ref 26–34)
MCHC RBC AUTO-ENTMCNC: 32.8 G/DL (ref 31–37)
MCV RBC AUTO: 96.8 FL
PLATELET # BLD AUTO: 294 10(3)UL (ref 150–450)
RBC # BLD AUTO: 3.72 X10(6)UL
WBC # BLD AUTO: 6.2 X10(3) UL (ref 4–11)

## 2024-05-08 PROCEDURE — 83020 HEMOGLOBIN ELECTROPHORESIS: CPT

## 2024-05-08 PROCEDURE — 36415 COLL VENOUS BLD VENIPUNCTURE: CPT

## 2024-05-08 PROCEDURE — 85027 COMPLETE CBC AUTOMATED: CPT

## 2024-05-08 PROCEDURE — 83036 HEMOGLOBIN GLYCOSYLATED A1C: CPT

## 2024-05-08 PROCEDURE — 82950 GLUCOSE TEST: CPT

## 2024-05-08 PROCEDURE — 83021 HEMOGLOBIN CHROMOTOGRAPHY: CPT

## 2024-05-08 NOTE — PROGRESS NOTES
Active fetus Denies any complaints.  Denies any vaginal bleeding, leaking of fluid or vaginal discharge.   No signs signs of PTL.  Reviewed S&S of PTL  Warning signs reviewed  All questions answered.     TDAP : declined    #rd trimester labs: Pt will have drawn today    28 week information pkg given

## 2024-05-15 LAB
HGB A2 MFR BLD: 2.7 % (ref 1.5–3.5)
HGB PNL BLD ELPH: 97.3 % (ref 95.5–100)

## 2024-05-30 ENCOUNTER — ROUTINE PRENATAL (OUTPATIENT)
Dept: OBGYN CLINIC | Facility: CLINIC | Age: 34
End: 2024-05-30

## 2024-05-30 VITALS
WEIGHT: 142 LBS | SYSTOLIC BLOOD PRESSURE: 96 MMHG | DIASTOLIC BLOOD PRESSURE: 64 MMHG | BODY MASS INDEX: 26 KG/M2 | HEART RATE: 75 BPM

## 2024-05-30 DIAGNOSIS — Z34.83 PRENATAL CARE, SUBSEQUENT PREGNANCY IN THIRD TRIMESTER (HCC): Primary | ICD-10-CM

## 2024-05-30 DIAGNOSIS — Z11.3 ROUTINE SCREENING FOR STI (SEXUALLY TRANSMITTED INFECTION): ICD-10-CM

## 2024-05-30 NOTE — PROGRESS NOTES
Feeling well. Endorses regular fetal movement. Denies contractions, LOF, vaginal bleeding.     Has had right sided lower pelvic pain since this weekend. Occurring mostly with movement or if standing long time. Recommend support belt and stretches    Plan:  Complete HIV and TREP prior to next visit  VANESSA 2 weeks    Reviewed third trimester and  labor warning signs and when to call.

## 2024-06-12 ENCOUNTER — LAB ENCOUNTER (OUTPATIENT)
Dept: LAB | Facility: HOSPITAL | Age: 34
End: 2024-06-12
Attending: OBSTETRICS & GYNECOLOGY
Payer: COMMERCIAL

## 2024-06-12 ENCOUNTER — ROUTINE PRENATAL (OUTPATIENT)
Dept: OBGYN CLINIC | Facility: CLINIC | Age: 34
End: 2024-06-12
Payer: COMMERCIAL

## 2024-06-12 VITALS
SYSTOLIC BLOOD PRESSURE: 103 MMHG | BODY MASS INDEX: 26 KG/M2 | HEART RATE: 83 BPM | DIASTOLIC BLOOD PRESSURE: 71 MMHG | WEIGHT: 144 LBS

## 2024-06-12 DIAGNOSIS — Z34.83 PRENATAL CARE, SUBSEQUENT PREGNANCY IN THIRD TRIMESTER (HCC): Primary | ICD-10-CM

## 2024-06-12 DIAGNOSIS — Z11.3 ROUTINE SCREENING FOR STI (SEXUALLY TRANSMITTED INFECTION): ICD-10-CM

## 2024-06-12 LAB — T PALLIDUM AB SER QL IA: NONREACTIVE

## 2024-06-12 PROCEDURE — 86780 TREPONEMA PALLIDUM: CPT

## 2024-06-12 PROCEDURE — 36415 COLL VENOUS BLD VENIPUNCTURE: CPT

## 2024-06-12 PROCEDURE — 87389 HIV-1 AG W/HIV-1&-2 AB AG IA: CPT

## 2024-06-12 NOTE — PROGRESS NOTES
Rose, , is at 32w5d, here for her VANESSA visit.  Currently, she is feeling well. Denies 3rd trimester danger signs.   Just completed HIV/Trep labs prior to this visit.    Vital signs and weight reviewed  See flowsheets    Assessment/Plan: Care is up to date  Next visit: 2 weeks    Reviewed:   Prenatal visit schedule   labor precautions  Kick counts  Danger signs    Pt verbalized understanding. All questions answered. No barriers to learning identified

## 2024-06-12 NOTE — PATIENT INSTRUCTIONS
Adapting to Pregnancy: Third Trimester    Although common during pregnancy, some discomforts may seem worse in the final weeks. Simple lifestyle changes can help. Take care of yourself. And ask your partner to help out with small tasks.  Limiting leg problems  Ways to combat leg issues:  Wear support hose all day.  Avoid snug shoes and clothes that bind, like tight pants and socks with elastic tops.  Sit with your feet and legs raised often.  Caring for your breasts  Tips to follow include:  Wash with plain water. Avoid using harsh soaps or rubbing alcohol. They may cause dryness.  Wear a nursing bra for extra support. It can also hide any leaks from your nipples.  Controlling hemorrhoids  Ways to avoid hemorrhoids include:  Eat foods that are high in fiber. Also, exercise and drink enough fluids. This will reduce constipation and hemorrhoids.  Sleep and nap on your side. This limits pressure on the veins of your rectum.  Try not to stand or sit for long periods.  Controlling back pain  As your body changes during pregnancy, your back must work in new ways. Back pain is due to many causes. Physical changes in your body can strain your back and its supporting muscles. Also, hormones (chemicals that carry messages throughout the body) increase during pregnancy. This can affect how your muscles and joints work together. All of these changes can lead to pain. Pain may be felt in the upper or lower back. Pain is also common in the pelvis. Some pregnant women have sciatica. This is pain caused by pressure on the sciatic nerve running down the back of the leg. Ask your healthcare provider for specific tips and exercises to help control your back pain.  Tips to help you rest  Good rest and sleep will help you feel better. Here are some ideas:  Ask your partner to massage your shoulders, neck, or back.  Limit the errands you do each day.  Lie down in the afternoon or after work for a few minutes.  Take a warm bath before  you go to sleep.  Drink warm milk or teas without caffeine.  Avoid coffee, black tea, and cola.  Stopping heartburn  Avoid spicy, greasy, fried, or acidic foods.  Eat small amounts more often. Eat slowly. Wait 2 hours after eating before lying down.  Sleep with your upper body raised 6 inches.   Managing mood swings  Ways to manage mood swings include:  Know that mood changes are normal.  Exercise often, but get plenty of rest.  Address any concerns and limit stress. Talking to your partner, other women, or your healthcare provider may help.  Dealing with urinary frequency  Tips to deal with having to urinate often include:  Drink plenty of water all day. If you drink a lot in the evening, though, you may have to get up more in the night.  Limit coffee, black tea, and cola.  [employer] last reviewed this educational content on 2/1/2018 © 2000-2020 The [employer]. [address] [place] [address]. All rights reserved. This information is not intended as a substitute for professional medical care. Always follow your healthcare professional's instructions.        Pregnancy: Your Third Trimester Changes  As the baby grows, your body changes too. You may also see signs that your body is getting ready for labor. Be patient. Within a few more weeks, your baby will be born.  How you are changing  Your body is preparing for the birth of your baby. Some of the most common changes are listed below. If you have any questions or concerns, ask your healthcare provider:  You’ll gain more weight from fluids, extra blood, and fat deposits.  Your breasts will grow as your body gets ready to feed the baby. They may be more tender. You may also notice a slight yellow or white discharge from the nipples.  Discharge from your vagina may increase. This is normal.  You might see some skin color changes on your forehead, cheeks, or nose. Most of these will go away after you deliver.  How your baby is growing       Month  7  Your baby can open and close his or her eyes and weighs around 4 pounds. If born prematurely (too early), your baby would likely survive with special care. Month 8  Your baby is building up body fat and weighs around 6 pounds. Month 9  Your baby weighs nearly 7 pounds and is about 19 to 21 inches long. In other words, any day now...   StayWell last reviewed this educational content on 2018 The [employer]. [address] [place] [address], [place] [address]. All rights reserved. This information is not intended as a substitute for professional medical care. Always follow your healthcare professional's instructions.   Understanding  Labor  Going into labor before your 37th week of pregnancy is called  labor.  labor can cause your baby to be born too soon. This can lead to a number of health problems that may affect your baby.      Before labor, the cervix is thick and closed.      In  labor, the cervix begins to efface (thin) and dilate (open).   Symptoms of  labor   If you think you’re having  labor, get medical help right away. Contractions alone don’t mean you’re in  labor. What matters more are changes in your cervix (the lower end of the uterus). Symptoms of  labor include:   Four or more contractions per hour  Strong contractions  Constant menstrual-like cramping  Low-back pain  Mucous or bloody vaginal discharge  Bleeding or spotting in the second or third trimester  Evaluating  labor   Your healthcare provider will try to find out whether you’re in  labor or whether you’re just having contractions. He or she may watch you for a few hours. The following tests may be done:   Pelvic exam to see if your cervix has effaced (thinned) and dilated (opened)  Uterine activity monitoring to detect contractions  Fetal monitoring to check the health of your baby  Ultrasound to check your baby’s size and position  Amniocentesis to  check how mature your baby’s lungs are  Caring for yourself at home   If you have  contractions, but your cervix is still thick and closed, your healthcare provider may ask you to do the following at home:   Drink plenty of water.  Do fewer activities.  Rest in bed on your side.  Don't have intercourse or nipple stimulation.  When to call your healthcare provider   Call your healthcare provider if you notice any of these:   Four or more contractions per hour  Bag of water breaks  Bleeding or spotting  If you need hospital care    labor often requires that you have hospital care and complete bed rest. You may have an IV (intravenous) line to get fluids. You may be given pills or injections to help prevent contractions. Finally, you may get medicine (corticosteroids) that helps your baby’s lungs mature more quickly.   Are you at risk?   Any pregnant woman can have  labor. It may start for no reason. But these risk factors can increase your chances:   Past  labor or past early birth  Smoking, drug, or alcohol use during pregnancy  Multiple fetuses (twins or more)  Problems with the shape of the uterus  Bleeding during the pregnancy  The dangers of  birth   A baby born too soon may have health problems. This is because the baby didn’t have enough time to mature. Some of the risks for your baby include:   Not breastfeeding or feeding well  Having immature lungs  Bleeding in the brain  Dying  Reaching term   Your goal is to get as close to term as you can before giving birth. The closer you get to term, the higher your chance of having a healthy baby. Work with your healthcare provider. Together, you can take steps that may keep you from giving birth too early.   [employer] last reviewed this educational content on 3/1/2019  © 0201-3849 [employer], [employer]. [address] [place] [address], [place] [address]. All rights reserved. This information is not intended as a substitute for  professional medical care. Always follow your healthcare professional's instructions.        Premature Labor    Premature labor ( labor) is when symptoms of labor occur before 37 weeks of pregnancy. (This is 3 weeks before your due date.) Premature labor can lead to premature delivery. This means giving birth to your baby early. Babies need at least 37 weeks of pregnancy for all the organs to develop normally. The earlier the delivery, the greater the risks to the baby.  In most cases, the cause of premature labor is unknown. But certain factors may make the problem more likely. These include:  History of premature labor with other pregnancies  Smoking  Alcohol or substance abuse  Low pre-pregnancy weight or weight gain during pregnancy  Short time period between pregnancies  Being pregnant with twins, triplets, or more  History of certain types of surgery on the cervix or uterus  Having a short cervix  Certain infections  There are a number of other risk factors. Ask your healthcare provider to help you understand the risk factors specific to your case. Then find out what you can do to control or reduce them.  Contractions are one of the main signs of premature labor. A contraction is different from cramping. It may feel painful and the belly (abdomen) may get hard. It can last from a few seconds to a few minutes. Some women may feel only a sense of pressure in the belly, thighs, rectum, or vagina. Some may feel only the hardening of the uterus without pain or pressure. Or there may be a constant pain in the lower back, which spreads forward toward the belly.Premature labor is often treated with medicines. A hospital stay may be needed. If labor doesn't progress and you and your baby are both healthy, you may be discharged to continue care at home.  Home care  Ask your provider any questions you have. Be certain you understand how to care for yourself at home. Also follow all recommendations given by your  healthcare providers.  Learn the signs of premature labor. Watch for these signs when you get home.  Limit or restrict activities as advised. This may include stopping certain physical activities and cutting back hours at work.  Avoid doing strenuous work as directed by your provider. Ask family and friends for help with tasks and support at home, if needed.  Don’t smoke, drink alcohol, or use other harmful substances.  Take steps to reduce stress.  Report any unusual symptoms to your provider.    Follow-up care  Follow up with your healthcare provider, or as directed. Weekly visits with your provider may be needed.  When to seek medical advice  Call your healthcare provider right away if any of these occur:  Regular or frequent contractions, whether they are painful or not  Pressure in the pelvis  Pressure in the lower belly or mild cramping in your belly with or without diarrhea  Constant low, dull backache  Gush or slow leaking of water from your vagina  Change in vaginal discharge (watery, mucus, or bloody)  Any vaginal bleeding  Decreased movement of your baby  [employer] last reviewed this educational content on 6/1/2018 © 2000-2020 The [employer]. [address] [place], [place] [id number]. All rights reserved. This information is not intended as a substitute for professional medical care. Always follow your healthcare professional's instructions.        Understanding Preeclampsia  Preeclampsia is high blood pressure (hypertension) that happens during pregnancy. It often shows up around the 20th week of pregnancy. It often goes back to normal by the 12th week after you give birth. It can lead to serious health risks for you and your baby. During your pregnancy, your healthcare provider will watch your blood pressure.    Symptoms  A common symptom of preeclampsia is high blood pressure. Other symptoms may include:  Rapid weight gain  Protein in your urine  Headache  Belly (abdominal) pain on your  right side  Vision problems. These include flashes or spots.  Swelling (edema) in your face or hands. This also often happens near the end of normal pregnancies, even without preeclampsia.  Tests you may have  Your healthcare provider will want to check your blood pressure throughout your pregnancy. If your blood pressure is high, you may have the following tests:  Urine tests to look for protein  Blood tests to confirm preeclampsia  Fetal monitoring to make sure that your baby is healthy  Treating preeclampsia  You may need to take a daily low dose of aspirin if you are at risk for preeclampsia. Preeclampsia almost always ends soon after you give birth. Until then, your healthcare provider can help manage your condition. If your symptoms are mild, you may need activity limits at home, including bed rest and no heavy lifting. If your symptoms are severe, you will stay in the hospital. Hospital treatment includes:  Activity limits to help control blood pressure. This means no heavy lifting and 8 hours per day lying down with the feet up.  Magnesium IV (intravenous) drip during labor to prevent seizures  Induced labor or surgical delivery by  section. Delivery is considered the cure for preeclampsia.  When to call your healthcare provider  Call your healthcare provider if swelling, weight gain, or other symptoms come on quickly or are severe. Some cases of preeclampsia are more severe than others. Your symptoms also may change or get worse as you get closer to your due date.  Who’s at risk?  No one knows what causes preeclampsia. Preeclampsia can happen in any pregnant woman. But it is more common in first-time pregnancies. Things that increase the risk include:  Previous pregnancies. You are at risk if you had preeclampsia, intrauterine growth retardation (IUGR),  birth, placental abruption, or fetal death in a past pregnancy.  Health history of mother. You are at risk if you have diabetes, high blood  pressure, obesity, kidney disease, autoimmune disease such as lupus, or a family history of preeclampsia.  Current pregnancy. You are at risk if this is your first pregnancy, or if you have multiple fetuses, are younger than age 18 or older than 40, or used in vitro fertilization.  Race. You are at risk if you are black.  Dangers of preeclampsia  If not treated, preeclampsia can cause problems for you and your baby. The placenta is the organ that nourishes your baby. It may tear away from the uterine wall. This can put the baby at risk for health problems (fetal distress) and premature birth. Preeclampsia can also cause these health problems:  Kidney failure or other organ damage  Seizures  Stroke  Once you give birth  In most cases, preeclampsia goes away on its own soon after you give birth. This is often by the 12th week after you give deliver. Within days of delivery, your blood pressure, swelling, and other symptoms should get better. For some women, problems from preeclampsia can continue after delivery.  Postpartum preeclampsia that develops within the first 48 hours after delivery is rare. Another type of postpartum preeclampsia that develops more than 48 hours after delivery is called late-onset preeclampsia. It is also rare. Contact your healthcare provider right away if you have symptoms of preeclampsia after you deliver.  [employer] last reviewed this educational content on 12/1/2019  © 5675-2749 The [employer], [employer]. [address] [place] [address], [place] [address]. All rights reserved. This information is not intended as a substitute for professional medical care. Always follow your healthcare professional's instructions.        Kick Counts    It’s normal to worry about your baby’s health. One way you can know your baby’s doing well is to record the baby’s movements once a day. This is called a kick count. Remember to take your kick count records to all your appointments with your healthcare provider.  How  to count kicks  Time how long it takes you to feel 10 kicks, flutters, swishes, or rolls. Ideally, you want to feel at least 10 movements within 2 hours. You will likely feel 10 movements in less time than that.  Starting at 28 weeks, count your baby's movements daily. Follow your healthcare provider's instructions for kick counting. Here are tips for counting kicks:  Choose a time when the baby is active, such as after a meal.   Sit comfortably or lie on your side.   The first time the baby moves, write down the time.   Count each movement until the baby has moved 10 times. This can take from 20 minutes to 2 hours.   If you have not felt 10 kicks by the end of the second hour, wait a few hours. Then try again.  Try to do it at the same time each day.  When to call your healthcare provider  Call your healthcare provider right away if:  You do a couple sets of kick counts during the day and your baby moves fewer than 10 times in 2 hours  Your baby moves much less often than on the days before.  You have not felt your baby move all day.  [employer] last reviewed this educational content on 12/1/2017  © 2007-2046 The [employer], [employer]. [address] [place] [address], [place] [address]. All rights reserved. This information is not intended as a substitute for professional medical care. Always follow your healthcare professional's instructions.

## 2024-06-24 ENCOUNTER — TELEPHONE (OUTPATIENT)
Dept: OBGYN CLINIC | Facility: CLINIC | Age: 34
End: 2024-06-24

## 2024-06-24 NOTE — TELEPHONE ENCOUNTER
----- Message from Malick Heredia sent at 6/24/2024  9:58 AM CDT -----  Regarding: Labs  This pt started [facility] with [facility].  Her NOB labs are in [facility].  Can you please abstract these into her chart.  Thanks   Spoke with patient and her spouse regarding discharge planning. They wanted a snf list to look at. If patient needs iv antibiotics her copay will be 502.97 a week which they cannot afford.  Will follow after they have chosen a facility

## 2024-06-24 NOTE — PROGRESS NOTES
Rose Roblero is a 34 year old  pt at 34w4d here for VANESSA  She is feeling pretty good, just tired    ROS:  Denies cramping, bleeding, leaking of fluid.  Fetus is active.    Vitals:    24 1003   BP: 101/69   Pulse: 76   Weight: 145 lb (65.8 kg)    See flowsheet  TW lbs (25-35 lbs TWG)  Tdap declined  3T labs WNL  NIPT low risk male      Assessment/Plan:  IUP at 34w4d  1. Prenatal care in third trimester (HCC)       Reviewed:  /Labor precautions  Kick counts  Danger Signs/PreE s/s  RTC 2 wk(s), GBS next visit    I have spent 20 minutes total time on the day of the encounter, including: preparing to see the patient, ordering/reviewing labs, performing a medically appropriate exam, and providing care coordination. face to face counseling, chart review, orders and coordination of care     Pt verbalized understanding.  All questions answered.  No barriers to learning identified

## 2024-06-25 ENCOUNTER — ROUTINE PRENATAL (OUTPATIENT)
Dept: OBGYN CLINIC | Facility: CLINIC | Age: 34
End: 2024-06-25

## 2024-06-25 VITALS
HEART RATE: 76 BPM | SYSTOLIC BLOOD PRESSURE: 101 MMHG | DIASTOLIC BLOOD PRESSURE: 69 MMHG | WEIGHT: 145 LBS | BODY MASS INDEX: 27 KG/M2

## 2024-06-25 DIAGNOSIS — Z34.93 PRENATAL CARE IN THIRD TRIMESTER (HCC): Primary | ICD-10-CM

## 2024-07-07 NOTE — PROGRESS NOTES
Patient's last menstrual period was 10/24/2023 (exact date). 2024, by Ultrasound 36w2d here today for VANESSA visit. Baby active. Denies contractions, LOF or bleeding.     GBS collected today.     Uncomplicated 's. 2nd son was in NICU for a few days d/t tachycardia and fluid in lungs. Went home and did well.         ICD-10-CM    1. Prenatal care in third trimester ([facility])  Z34.93       2. Encounter for supervision of other normal pregnancy in third trimester ([facility])  Z34.83 Group B Strep PCR     Group B Strep PCR      3. Fundal height low for dates in third trimester ([facility])  O26.843    -To triage for NST and growth u/s       SOL and warning signs reviewed. 36 wk packet given. To review birth plan at [facility]

## 2024-07-09 ENCOUNTER — ROUTINE PRENATAL (OUTPATIENT)
Dept: OBGYN CLINIC | Facility: CLINIC | Age: 34
End: 2024-07-09

## 2024-07-09 ENCOUNTER — APPOINTMENT (OUTPATIENT)
Dept: ULTRASOUND IMAGING | Facility: HOSPITAL | Age: 34
End: 2024-07-09
Attending: ADVANCED PRACTICE MIDWIFE
Payer: COMMERCIAL

## 2024-07-09 ENCOUNTER — HOSPITAL ENCOUNTER (OUTPATIENT)
Facility: HOSPITAL | Age: 34
Discharge: HOME | End: 2024-07-09
Attending: ADVANCED PRACTICE MIDWIFE | Admitting: OBSTETRICS & GYNECOLOGY
Payer: COMMERCIAL

## 2024-07-09 VITALS — SYSTOLIC BLOOD PRESSURE: 118 MMHG | DIASTOLIC BLOOD PRESSURE: 76 MMHG | HEART RATE: 74 BPM | TEMPERATURE: 98 F

## 2024-07-09 VITALS
WEIGHT: 144 LBS | BODY MASS INDEX: 26 KG/M2 | SYSTOLIC BLOOD PRESSURE: 114 MMHG | HEART RATE: 76 BPM | DIASTOLIC BLOOD PRESSURE: 79 MMHG

## 2024-07-09 DIAGNOSIS — Z34.93 PRENATAL CARE IN THIRD TRIMESTER (HCC): Primary | ICD-10-CM

## 2024-07-09 DIAGNOSIS — O26.843 FUNDAL HEIGHT LOW FOR DATES IN THIRD TRIMESTER (HCC): ICD-10-CM

## 2024-07-09 DIAGNOSIS — Z34.83 ENCOUNTER FOR SUPERVISION OF OTHER NORMAL PREGNANCY IN THIRD TRIMESTER (HCC): ICD-10-CM

## 2024-07-09 PROCEDURE — 59025 FETAL NON-STRESS TEST: CPT | Performed by: ADVANCED PRACTICE MIDWIFE

## 2024-07-09 PROCEDURE — 76816 OB US FOLLOW-UP PER FETUS: CPT | Performed by: ADVANCED PRACTICE MIDWIFE

## 2024-07-09 NOTE — PROGRESS NOTES
Patient returned from ultrasound department in stable condition via wheelchair.  [facility] reapplied for NST

## 2024-07-09 NOTE — PATIENT INSTRUCTIONS
Kick Counts  It’s normal to worry about your baby’s health. One way you can know your baby’s doing well is to record the baby’s movements once a day. This is called a kick count.   Remember to take your kick count records to all your appointments with your healthcare provider.  How to count kicks    Time how long it takes you to feel 10 kicks, flutters, swishes, or rolls. Ideally, you want to feel at least 10 movements in 2 hours. You will likely feel 10 movements in less time than that.  Starting at 28 weeks, count your baby's movements daily. Follow your healthcare provider's instructions for kick counting. Here are tips for counting kicks:  Choose a time when the baby is active, such as after a meal.   Sit comfortably or lie on your side.   The first time the baby moves, write down the time.   Count each movement until the baby has moved  10 times. This can take from 20 minutes to 2 hours.   If you haven't felt 10 kicks by the end of the second hour, wait a few hours. Then try again.  Try to do it at the same time each day.  When to call your healthcare provider  Call your healthcare provider  right away if:  You do a couple sets of kick counts during the day and your baby moves fewer than 10 times in 2 hours.  Your baby moves much less often than on the days before.  You haven't felt your baby move all day.  [employer] last reviewed this educational content on 8/1/2020    © 1124-1532 [employer]. All rights reserved. This information is not intended as a substitute for professional medical care. Always follow your healthcare professional's instructions. Understanding Preeclampsia  Preeclampsia is a condition that can happen in pregnancy. It includes high blood pressure (hypertension), swelling, and signs of organ problems. It can show up around week 20 of pregnancy. It often goes away by 12 weeks after you give birth. It can lead to serious health risks for you and your baby. During your  pregnancy, your healthcare provider will watch your blood pressure.      Your blood pressure will be monitored regularly throughout your pregnancy to help check for preeclampsia.     Dangers of preeclampsia   If not treated, preeclampsia can cause problems for you and your baby. The placenta is the organ that nourishes your baby. It may tear away from the wall of the uterus. This can put the baby at risk for health problems (fetal distress). It can put the baby at risk for  birth. Preeclampsia can also cause these health problems in you:   Kidney failure or other organ damage  Seizures  Stroke  Who’s at risk for preeclampsia?   No one knows what causes preeclampsia. It can happen in any pregnant person. But there are things that increase your risk. You may need to take a daily low dose of aspirin if you are at risk for preeclampsia.   You’re at higher risk for preeclampsia if you have any of these:  Diabetes  High blood pressure  Obesity  Kidney disease  Autoimmune disease such as lupus  A family history of preeclampsia  You’re at higher risk if any of these apply to you:  This is your first pregnancy  You are having twins or more  You’re under age 18 or over age 40  You used in vitro fertilization  You are Black  And you’re at higher risk if you had any of these in a past pregnancy:   Preeclampsia  Intrauterine growth retardation (IUGR)   birth  Placental abruption  Fetal death  Symptoms  A common symptom of preeclampsia is high blood pressure. Other symptoms may include:   Fast weight gain  Protein in your urine  Headache  Belly (abdominal) pain on your right side  Vision problems such as flashes or spots  Swelling (edema) in your face or hands (this often happens near the end of a normal pregnancy)  Tests you may have   Your healthcare provider will want to check your blood pressure. This will need to be done often in your pregnancy. If your blood pressure is high, you may have these tests:   Urine  tests to look for protein  Blood tests to confirm preeclampsia  Fetal monitoring to make sure that your baby is healthy  Treating preeclampsia   Preeclampsia almost always ends soon after you give birth. Until then, your healthcare provider can help you manage it.   If your symptoms are mild, you may to:     Limit your activity  Rest in bed  Not do heavy lifting    If your symptoms are severe, you will stay in the hospital. Treatment here may include:   Limits to your activity. This is to help control your blood pressure. You should not lift anything heavy. You will need to spend 8 hours a day lying down with your feet up.  Magnesium IV (intravenous) drip. This is done during labor. It's to prevent seizures  Induced labor or  section. Birth of the baby is considered the cure for preeclampsia.  When to call your healthcare provider   Call your healthcare provider if your symptoms start quickly or are severe. This includes swelling, weight gain, or other symptoms. Some cases of preeclampsia are more severe than others. Your symptoms also may change or get worse as you get closer to your due date.   Once you give birth   In most cases, preeclampsia goes away on its own soon after you give birth. This is often by the 12th week after you deliver. Within days after you give birth, your blood pressure, swelling, and other symptoms should get better. But for some people, problems from preeclampsia can continue after birth.   Postpartum preeclampsia   Preeclampsia that starts after birth is rare. There are 2 types:   Postpartum preeclampsia. This may start in the first 48 hours after birth.  Late-onset preeclampsia. This starts more than 48 hours after birth.  Both of these types are rare. But call your healthcare provider right away if you have symptoms of preeclampsia after you give birth.   [employer] last reviewed this educational content on 10/1/2021    © 3692-4609 [employer]. All rights reserved.  This information is not intended as a substitute for professional medical care. Always follow your healthcare professional's instructions.

## 2024-07-09 NOTE — PROGRESS NOTES
Pt is a 34 year old female admitted to TR2/TR2-A.     Chief Complaint   Patient presents with    Non-stress Test     Pt. Sent from the office for NST and growth ultrasound.         Pt is  36w4d intra-uterine pregnancy.  History obtained, pt. Oriented to room, staff, and plan of care.

## 2024-07-09 NOTE — TRIAGE
[facility]  part of [facility]      Triage Note    Rose Roblero Patient Status:  Outpatient    3/9/1990 MRN [id number]   Location [facility] Attending Mallory Khan CNM   Hosp Day # 0 PCP Tory Boyle MD      Para:   Estimated Date of Delivery: 24  Gestation: 36w4d    Chief Complaint    Non-stress Test         Allergies:  No Known Allergies    No orders of the defined types were placed in this encounter.      Lab Results   Component Value Date    WBC 6.2 2024    HGB 11.8 (L) 2024    HCT 36.0 2024    .0 2024    CREATSERUM 0.65 2020    BUN 10 2020     2020    K 3.5 2020     2020    CO2 26.0 2020    GLU 72 2020    CA 9.2 2020    ALB 4.0 2020    ALKPHO 52 2020    BILT 0.5 2020    TP 8.9 (H) 2020    AST 16 2020    ALT 26 2020    TSH 1.600 2019       Clinitek UA  Lab Results   Component Value Date    GLUUR Negative 2020    SPECGRAVITY 1.026 2020    URINECUL Escherichia coli 2024       UA  Lab Results   Component Value Date    COLORUR Yellow 2020    CLARITY Clear 2020    SPECGRAVITY 1.026 2020    PROUR 30 (A) 2020    GLUUR Negative 2020    KETUR Trace (A) 2020    BILUR Negative 2020    BLOODURINE Negative 2020    NITRITE Negative 2020    UROBILINOGEN <2.0 2020    LEUUR Negative 2020    UASA Negative 2019       Vitals:    24 1015   BP: 118/76   Pulse: 74   Temp: 98.1 °F (36.7 °C)   TempSrc: Oral       NST:  Reactive  Variability: Moderate           Accelerations: Yes           Decelerations: None            Baseline: 150 BPM           Uterine Irritability: No           Contractions: Not present                                        Acoustic Stimulator: No           Nonstress Test Interpretation: Reactive            Nonstress Test Second Interpretation: Reactive          FHR Category: Category I        Chief Complaint   Patient presents with    Non-stress Test     Pt. Sent from the office for NST and growth ultrasound.            @ 36 4/7 wks. here for NST, and growth U/S.  NST reactive.  ultrasound results reviewed by OLGA Khan CNM.  order received to discharge pt. home.     Discharged home  Ambulatory and in stable condition with written and verbal labor, and preeclampsia instructions. Patient verbalizes understanding of information given.         Lucina PALOMARES RN  2024 12:38 PM    CNM Addendum  NST reviewed and reactive. Growth ultrasound WNL. Agree with plan above.  Mallory Khan CNM

## 2024-07-10 LAB — GROUP B STREP BY PCR FOR PCR OVT: NEGATIVE

## 2024-07-13 ENCOUNTER — HOSPITAL ENCOUNTER (INPATIENT)
Facility: HOSPITAL | Age: 34
LOS: 2 days | Discharge: HOME | End: 2024-07-15
Attending: ADVANCED PRACTICE MIDWIFE | Admitting: OBSTETRICS & GYNECOLOGY
Payer: COMMERCIAL

## 2024-07-13 ENCOUNTER — MOBILE ENCOUNTER (OUTPATIENT)
Dept: OBGYN CLINIC | Facility: CLINIC | Age: 34
End: 2024-07-13

## 2024-07-13 PROBLEM — O42.92 FULL-TERM PREMATURE RUPTURE OF MEMBRANES (HCC): Status: ACTIVE | Noted: 2024-07-13

## 2024-07-13 PROBLEM — O42.90 RUPTURED, MEMBRANES, PREMATURE (HCC): Status: ACTIVE | Noted: 2024-07-13

## 2024-07-13 PROBLEM — Z87.59 HISTORY OF PRIOR PREGNANCY WITH SGA NEWBORN: Status: ACTIVE | Noted: 2024-07-13

## 2024-07-13 LAB
ANTIBODY SCREEN: NEGATIVE
BASOPHILS # BLD AUTO: 0.02 X10(3) UL (ref 0–0.2)
BASOPHILS NFR BLD AUTO: 0.4 %
DEPRECATED RDW RBC AUTO: 41.5 FL (ref 35.1–46.3)
EOSINOPHIL # BLD AUTO: 0.02 X10(3) UL (ref 0–0.7)
EOSINOPHIL NFR BLD AUTO: 0.4 %
ERYTHROCYTE [DISTWIDTH] IN BLOOD BY AUTOMATED COUNT: 12.5 % (ref 11–15)
HCT VFR BLD AUTO: 37.1 %
HGB BLD-MCNC: 13.1 G/DL
IMM GRANULOCYTES # BLD AUTO: 0.02 X10(3) UL (ref 0–1)
IMM GRANULOCYTES NFR BLD: 0.4 %
LYMPHOCYTES # BLD AUTO: 1.04 X10(3) UL (ref 1–4)
LYMPHOCYTES NFR BLD AUTO: 21.3 %
MCH RBC QN AUTO: 32.4 PG (ref 26–34)
MCHC RBC AUTO-ENTMCNC: 35.3 G/DL (ref 31–37)
MCV RBC AUTO: 91.8 FL
MONOCYTES # BLD AUTO: 0.4 X10(3) UL (ref 0.1–1)
MONOCYTES NFR BLD AUTO: 8.2 %
NEUTROPHILS # BLD AUTO: 3.38 X10 (3) UL (ref 1.5–7.7)
NEUTROPHILS # BLD AUTO: 3.38 X10(3) UL (ref 1.5–7.7)
NEUTROPHILS NFR BLD AUTO: 69.3 %
PLATELET # BLD AUTO: 248 10(3)UL (ref 150–450)
RBC # BLD AUTO: 4.04 X10(6)UL
RH BLOOD TYPE: POSITIVE
T PALLIDUM AB SER QL IA: NONREACTIVE
WBC # BLD AUTO: 4.9 X10(3) UL (ref 4–11)

## 2024-07-13 PROCEDURE — 86901 BLOOD TYPING SEROLOGIC RH(D): CPT | Performed by: ADVANCED PRACTICE MIDWIFE

## 2024-07-13 PROCEDURE — 85025 COMPLETE CBC W/AUTO DIFF WBC: CPT | Performed by: ADVANCED PRACTICE MIDWIFE

## 2024-07-13 PROCEDURE — 86900 BLOOD TYPING SEROLOGIC ABO: CPT | Performed by: ADVANCED PRACTICE MIDWIFE

## 2024-07-13 PROCEDURE — 86780 TREPONEMA PALLIDUM: CPT | Performed by: ADVANCED PRACTICE MIDWIFE

## 2024-07-13 PROCEDURE — 86850 RBC ANTIBODY SCREEN: CPT | Performed by: ADVANCED PRACTICE MIDWIFE

## 2024-07-13 PROCEDURE — 99214 OFFICE O/P EST MOD 30 MIN: CPT

## 2024-07-13 RX ORDER — ACETAMINOPHEN 500 MG
500 TABLET ORAL EVERY 6 HOURS PRN
Status: DISCONTINUED | OUTPATIENT
Start: 2024-07-13 | End: 2024-07-15

## 2024-07-13 RX ORDER — SIMETHICONE 80 MG
80 TABLET,CHEWABLE ORAL 3 TIMES DAILY PRN
Status: DISCONTINUED | OUTPATIENT
Start: 2024-07-13 | End: 2024-07-15

## 2024-07-13 RX ORDER — ONDANSETRON 2 MG/ML
4 INJECTION INTRAMUSCULAR; INTRAVENOUS EVERY 6 HOURS PRN
Status: DISCONTINUED | OUTPATIENT
Start: 2024-07-13 | End: 2024-07-15

## 2024-07-13 RX ORDER — CITRIC ACID/SODIUM CITRATE 334-500MG
30 SOLUTION, ORAL ORAL AS NEEDED
Status: DISCONTINUED | OUTPATIENT
Start: 2024-07-13 | End: 2024-07-13 | Stop reason: HOSPADM

## 2024-07-13 RX ORDER — DEXTROSE, SODIUM CHLORIDE, SODIUM LACTATE, POTASSIUM CHLORIDE, AND CALCIUM CHLORIDE 5; .6; .31; .03; .02 G/100ML; G/100ML; G/100ML; G/100ML; G/100ML
INJECTION, SOLUTION INTRAVENOUS CONTINUOUS
Status: DISCONTINUED | OUTPATIENT
Start: 2024-07-13 | End: 2024-07-13 | Stop reason: HOSPADM

## 2024-07-13 RX ORDER — ACETAMINOPHEN 500 MG
1000 TABLET ORAL EVERY 6 HOURS PRN
Status: DISCONTINUED | OUTPATIENT
Start: 2024-07-13 | End: 2024-07-15

## 2024-07-13 RX ORDER — BISACODYL 10 MG
10 SUPPOSITORY, RECTAL RECTAL ONCE AS NEEDED
Status: DISCONTINUED | OUTPATIENT
Start: 2024-07-13 | End: 2024-07-15

## 2024-07-13 RX ORDER — IBUPROFEN 600 MG/1
600 TABLET ORAL ONCE AS NEEDED
Status: DISCONTINUED | OUTPATIENT
Start: 2024-07-13 | End: 2024-07-13 | Stop reason: HOSPADM

## 2024-07-13 RX ORDER — ONDANSETRON 2 MG/ML
4 INJECTION INTRAMUSCULAR; INTRAVENOUS EVERY 6 HOURS PRN
Status: DISCONTINUED | OUTPATIENT
Start: 2024-07-13 | End: 2024-07-13 | Stop reason: HOSPADM

## 2024-07-13 RX ORDER — IBUPROFEN 600 MG/1
600 TABLET ORAL EVERY 6 HOURS
Status: DISCONTINUED | OUTPATIENT
Start: 2024-07-13 | End: 2024-07-15

## 2024-07-13 RX ORDER — ACETAMINOPHEN 500 MG
500 TABLET ORAL EVERY 6 HOURS PRN
Status: DISCONTINUED | OUTPATIENT
Start: 2024-07-13 | End: 2024-07-13 | Stop reason: HOSPADM

## 2024-07-13 RX ORDER — SODIUM CHLORIDE, SODIUM LACTATE, POTASSIUM CHLORIDE, CALCIUM CHLORIDE 600; 310; 30; 20 MG/100ML; MG/100ML; MG/100ML; MG/100ML
INJECTION, SOLUTION INTRAVENOUS AS NEEDED
Status: DISCONTINUED | OUTPATIENT
Start: 2024-07-13 | End: 2024-07-13 | Stop reason: HOSPADM

## 2024-07-13 RX ORDER — DOCUSATE SODIUM 100 MG/1
100 CAPSULE, LIQUID FILLED ORAL
Status: DISCONTINUED | OUTPATIENT
Start: 2024-07-13 | End: 2024-07-15

## 2024-07-13 RX ORDER — TERBUTALINE SULFATE 1 MG/ML
0.25 INJECTION, SOLUTION SUBCUTANEOUS AS NEEDED
Status: DISCONTINUED | OUTPATIENT
Start: 2024-07-13 | End: 2024-07-13 | Stop reason: HOSPADM

## 2024-07-13 RX ORDER — BENZOCAINE/MENTHOL 6 MG-10 MG
1 LOZENGE MUCOUS MEMBRANE EVERY 6 HOURS PRN
Status: DISCONTINUED | OUTPATIENT
Start: 2024-07-13 | End: 2024-07-15

## 2024-07-13 RX ORDER — ACETAMINOPHEN 500 MG
1000 TABLET ORAL EVERY 6 HOURS PRN
Status: DISCONTINUED | OUTPATIENT
Start: 2024-07-13 | End: 2024-07-13 | Stop reason: HOSPADM

## 2024-07-13 RX ORDER — AMMONIA INHALANTS 0.04 G/.3ML
0.3 INHALANT RESPIRATORY (INHALATION) AS NEEDED
Status: DISCONTINUED | OUTPATIENT
Start: 2024-07-13 | End: 2024-07-15

## 2024-07-13 RX ORDER — LIDOCAINE HYDROCHLORIDE 10 MG/ML
30 INJECTION, SOLUTION EPIDURAL; INFILTRATION; INTRACAUDAL; PERINEURAL ONCE
Status: DISCONTINUED | OUTPATIENT
Start: 2024-07-13 | End: 2024-07-13 | Stop reason: HOSPADM

## 2024-07-13 NOTE — PROGRESS NOTES
[facility]  part of [facility]    Labor Progress Note    Rose Roblero Patient Status:  Outpatient    3/9/1990 MRN [id number]   Location [facility] Attending Mallory Khan CNM   Hosp Day # 0 PCP Tory Boyle MD     Subjective:   Interval History:   Rose remains comfortable. She is not feeling contractions and is amenable to pitocin at this time. FOB present and supportive.     Objective:   Vital signs in last 24 hours:  Temp:  [98.1 °F (36.7 °C)-98.4 °F (36.9 °C)] 98.4 °F (36.9 °C)  Pulse:  [67-72] 67  Resp:  [16-17] 17  BP: (111-119)/(79-82) 111/82    Input/Output:  No intake or output data in the 24 hours ending 24 1445    Fetal Surveillance:  Fetal heart tones: 155 baseline/moderate variability/accels present/no decels  Uterine contractions:  6-9 minutes apart    Sterile vaginal exam:  Deferred    Results:   Lab Results   Component Value Date    TREPONEMALAB Nonreactive 2024    ABO O 2024    RH Positive 2024    WBC 4.9 2024    HGB 13.1 2024    HCT 37.1 2024    .0 2024    CREATSERUM 0.65 2020    BUN 10 2020     2020    K 3.5 2020     2020    CO2 26.0 2020    GLU 72 2020    CA 9.2 2020    ALB 4.0 2020    ALKPHO 52 2020    BILT 0.5 2020    TP 8.9 (H) 2020    AST 16 2020    ALT 26 2020    TSH 1.600 2019       Lab Results   Component Value Date    COLORUR Yellow 2020    CLARITY Clear 2020    SPECGRAVITY 1.026 2020    PROUR 30 (A) 2020    GLUUR Negative 2020    KETUR Trace (A) 2020    BILUR Negative 2020    BLOODURINE Negative 2020    NITRITE Negative 2020    UROBILINOGEN <2.0 2020    LEUUR Negative 2020    UASA Negative 2019       Assessment & Plan:     Pregnancy (HCC)       Fundal height low for dates in third trimester  (HCC)  Ultrasound  EFW 2626g/24%ile       Full-term premature rupture of membranes (HCC)               Mec stained fluid     Rose aponte a 35yo  at 37.1wks was admitted for PROM. Pregnancy history significant for fundal height low for dates with normal fetal growth. GBS negative                Cat 1 FHTs    SROM, light mec x 5.5hrs. Afebrile   Recommend pitocin at this time and patient amenable. Initiate pitocin protocol 2                Continuous monitoring                Anticipate vaginal delivery    Plan discussed with patient who verbalizes understanding and agreement.    Mallory Khan CNM  2024

## 2024-07-13 NOTE — PROGRESS NOTES
Pt is a 34 year old female admitted to TR3/TR3-A.     Chief Complaint   Patient presents with    R/o Rom     Patient reports gush of yellow/green fluid around 0915 this AM      Pt is  37w1d intra-uterine pregnancy.  History obtained, consents signed. Oriented to room, staff, and plan of care.

## 2024-07-13 NOTE — PLAN OF CARE
Problem: Patient Centered Care  Goal: Patient preferences are identified and integrated in the patient's plan of care  Description: Interventions:  - What would you like us to know as we care for you? Pt declines epidural  - Provide timely, complete, and accurate information to patient/family  - Incorporate patient and family knowledge, values, beliefs, and cultural backgrounds into the planning and delivery of care  - Encourage patient/family to participate in care and decision-making at the level they choose  - Honor patient and family perspectives and choices  Outcome: Progressing     Problem: BIRTH - VAGINAL/ SECTION  Goal: Fetal and maternal status remain reassuring during the birth process  Description: INTERVENTIONS:  - Monitor vital signs  - Monitor fetal heart rate  - Monitor uterine activity  - Monitor labor progression (vaginal delivery)  - DVT prophylaxis (C/S delivery)  - Surgical antibiotic prophylaxis (C/S delivery)  Outcome: Progressing     Problem: PAIN - ADULT  Goal: Verbalizes/displays adequate comfort level or patient's stated pain goal  Description: INTERVENTIONS:  - Encourage pt to monitor pain and request assistance  - Assess pain using appropriate pain scale  - Administer analgesics based on type and severity of pain and evaluate response  - Implement non-pharmacological measures as appropriate and evaluate response  - Consider cultural and social influences on pain and pain management  - Manage/alleviate anxiety  - Utilize distraction and/or relaxation techniques  - Monitor for opioid side effects  - Notify MD/LIP if interventions unsuccessful or patient reports new pain  - Anticipate increased pain with activity and pre-medicate as appropriate  Outcome: Progressing     Problem: ANXIETY  Goal: Will report anxiety at manageable levels  Description: INTERVENTIONS:  - Administer medication as ordered  - Teach and rehearse alternative coping skills  - Provide emotional support with 1:1  interaction with staff  Outcome: Progressing     Problem: Patient/Family Goals  Patient's Long Term Goal: Uncomplicated Delivery     Interventions:  - Assessment/Monitoring  - Induction/Augmentation per protocol and Provider order  - C/S per protocol and Provider order   - Education  - Intervention per protocol and Provider order with education   - Involve patient in POC  - See additional Care Plan goals for specific interventionsPatient's Short Term Goal: Comfort and Pain Control     Interventions:   - Non Pharmacological pain intervention   - IV/IM and Epidural pain medication per Provider order and patient request  - Education  - Involve Patient in POC   - See additional Care Plan goals for specific interventions

## 2024-07-13 NOTE — PROGRESS NOTES
Paged by patient. Reports two gushes of yellow/green fluid at 9:10 and 9:25. Endorses fetal movement. Denies contractions. Recommend proceeding to [facility] for evaluation. Triage RN informed.

## 2024-07-13 NOTE — PROGRESS NOTES
[facility]  part of [facility]    Labor Progress Note    Rose Roblero Patient Status:  Outpatient    3/9/1990 MRN [id number]   Location [facility] Attending Mallory Khan CNM   Hosp Day # 0 PCP Tory Boyle MD     Subjective:   Interval History:   Rose remains comfortable. States she had another gush of fluid when she went to the bathroom. FOB present and supportive.     Objective:   Vital signs in last 24 hours:  Temp:  [98.1 °F (36.7 °C)-98.4 °F (36.9 °C)] 98.4 °F (36.9 °C)  Pulse:  [67-72] 67  Resp:  [16-17] 17  BP: (111-119)/(79-82) 111/82    Input/Output:  No intake or output data in the 24 hours ending 24 1251    Fetal Surveillance:  Fetal heart tones: 150 baseline/moderate variability/accels present/no decels  Uterine contractions:  2-10 minutes apart    Sterile vaginal exam:  Dilation: 5 cm dilation   Effacement:  80%    Station: -3   Position: Cephalic  Presentation: vertex    Results:   Lab Results   Component Value Date    TREPONEMALAB Nonreactive 2024    ABO O 2024    RH Positive 2024    WBC 4.9 2024    HGB 13.1 2024    HCT 37.1 2024    .0 2024    CREATSERUM 0.65 2020    BUN 10 2020     2020    K 3.5 2020     2020    CO2 26.0 2020    GLU 72 2020    CA 9.2 2020    ALB 4.0 2020    ALKPHO 52 2020    BILT 0.5 2020    TP 8.9 (H) 2020    AST 16 2020    ALT 26 2020    TSH 1.600 2019       Lab Results   Component Value Date    COLORUR Yellow 2020    CLARITY Clear 2020    SPECGRAVITY 1.026 2020    PROUR 30 (A) 2020    GLUUR Negative 2020    KETUR Trace (A) 2020    BILUR Negative 2020    BLOODURINE Negative 2020    NITRITE Negative 2020    UROBILINOGEN <2.0 2020    LEUUR Negative 2020    UASA Negative 2019       Assessment &  Plan:     Pregnancy (HCC)       Fundal height low for dates in third trimester ([facility])  Ultrasound  EFW 2626g/24%ile       Full-term premature rupture of membranes (HCC)               Mec stained fluid     Rose aponte a 35yo  at 37.1wks was admitted for PROM. Pregnancy history significant for fundal height low for dates with normal fetal growth. GBS negative                Cat 1 FHTs                Forebag ruptured    Will expectantly manage for now. Patient to ambulate. Will plan pitocin if needed for augmentation    SROM, light mec x 3.5hrs. Afebrile                Continuous monitoring                Anticipate vaginal delivery    Plan discussed with patient who verbalizes understanding and agreement.    Mallory Khan CNM  2024

## 2024-07-13 NOTE — PROGRESS NOTES
Pt is a 34 year old female admitted to LDR8/LDR8-A.     Chief Complaint   Patient presents with    R/o Rom     Patient reports gush of yellow/green fluid around 0915 this AM      Pt is  37w1d intra-uterine pregnancy.  History obtained, consents signed. Oriented to room, staff, and plan of care.

## 2024-07-13 NOTE — H&P
[facility]  part of [facility]    History & Physical    Rose Roblero Patient Status:  Outpatient    3/9/1990 MRN [id number]   Location [facility] Attending Mallory Khan CNM   Hosp Day # 0 Admitting Blanquita Stephens MD     Date of Admission:  2024      HPI:   Rose Roblreo is a 34 year old  current EGA of 37w1d with an estimated date of delivery of: 2024, by Ultrasound at 8.5wks    Rose is being admitted for PROM.    Her current obstetrical history is significant for  fundal height low for dates .    Patient reports good fetal movement, leaking of yellow/green fluid since 910, no bleeding, and no contractions .     Fetal Movement: normal.     History   Obstetric History:   OB History    Para Term  AB Living   4 2 2   1 2   SAB IAB Ectopic Multiple Live Births           2      # Outcome Date GA Lbr [place]son/2nd Weight Sex Type Anes PTL Lv   4 Current            3 AB 2017           2 Term 13 40w1d  7 lb 8 oz (3.402 kg) M Vag-Spont   CORBY   1 Term 12 40w2d  9 lb 6 oz (4.252 kg) F Vag-Spont   CORBY     Past Medical History: No past medical history on file.  Past Social History:   Past Surgical History:   Procedure Laterality Date    Appendectomy       Family History:   Family History   Problem Relation Age of Onset    Diabetes Mother     No Known Problems Maternal Grandmother     No Known Problems Maternal Grandfather     No Known Problems Paternal Grandmother     No Known Problems Paternal Grandfather      Social History:   Social History     Tobacco Use    Smoking status: Never     Passive exposure: Never    Smokeless tobacco: Never   Substance Use Topics    Alcohol use: Not Currently        Allergies/Medications:   Allergies:   No Known Allergies  Medications:  Medications Prior to Admission   Medication Sig Dispense Refill Last Dose    prenatal vitamin with DHA 27-0.8-228 MG Oral Cap Take 1 capsule by mouth daily.    7/12/2024       Review of Systems:   Constitutional: denies fever, aches, chills  HEENT: denies visual changes  Skin: denies any unusual skin lesions or itching  Lungs: denies shortness of breath  Cardiovascular: denies chest pain  GI: denies abdominal pain,denies heartburn, denies constipation or diarrhea  : denies dysuria, pelvic pain, vaginal discharge or bleeding  Musculoskeletal: denies back or joint pain  Neuro denies headaches or dizziness  Psych: denies depression or anxiety    Physical Exam:   Temp:  [98.1 °F (36.7 °C)] 98.1 °F (36.7 °C)  Pulse:  [72] 72  Resp:  [16] 16  BP: (119)/(79) 119/79    Constitutional: alert, appears stated age, and cooperative comfortable   Skin: no lesions or erythema  Respiratory: clear, unlabored  Cardiac: RRR  Abdomen: soft, non-tender, EFW 6#, presentation cephalic, uterine contractions q 10 minutes  Fetal Surveillance:  155 BPM; Fetal heart variability: moderate  Fetal Heart Rate decelerations: none  Fetal Heart Rate accelerations: yes  Baseline FHR: 155 per minute  Uterine contractions: regular, every 10 minutes  Pelvis: Gynecoid  External Genitalia:  No lesions  Sterile vaginal exam: Deferred   Per RN pooling green fluid and bulging forebag with green fluid. Amniotest positive. Positive ferning.  Extremities: extremities normal, atraumatic, no cyanosis or edema  Musculoskeletal: steady gait  Reflexes: +1/+1  Psych:  Appropriate affect and speech    Results:     Prenatal Results       Initial       Test Value Reference Range Date Time    Blood Type (ABO Group) ^ O   01/16/24     Rh Factor ^ Positive   01/16/24     Antibody Screen (Required questions in OE to answer) ^ Negative   01/16/24     HCT ^ 35.7   01/16/24        37.0 % 35.0 - 48.0 12/27/23 1557    HGB ^ 12.1   01/16/24        12.2 g/dL 12.0 - 16.0 12/27/23 1557    MCV ^ 92   01/16/24        90.9 fL 80.0 - 100.0 12/27/23 1557    Platelets ^ 293   01/16/24        296.0 10(3)uL 150.0 - 450.0 12/27/23 1557    Rubella  ^ Positive   24     TREP ^ Non-reactive   24     RPR ([facility])        Urine Culture ^ Escherichia coli   24     Hepatitis B ^ Non-reactive   24     HIV Combo ^ Non-reactive   24     HCV ^ Non-reactive   24               Optional Initial Labs       Test Value Reference Range Date Time    TSH        Pap Smear  Negative for intraepithelial lesion or malignancy  Negative for intraepithelial lesion or malignancy, Negative for intraepithelial lesion or malignancy-Reactive/Other changes, Unsatisfactory for Evaluation 05/15/19 1203    HPV        GC DNA        Chlamydia DNA        GTT 1 Hr        Glucose Fasting        Glucose 1 Hr        Glucose 2 Hr        Glucose 3 Hr        HgB A1c        Vitamin D                  8-20 Weeks       Test Value Reference Range Date Time    1st Trimester Aneuploidy Risk Assessment        Quad - Down Screen Risk Estimate - prior to 18        Quad - Down Maternal Age Risk - prior to 18        Quad - Trisomy 18 screen Risk Estimate - prior to 18        AFP Spina Bifida (Required questions in OE to answer )        QUAD/AFP - Interpretation        Free Fetal DNA         Genetic testing ^ Low Risk   24     Genetic testing        Genetic testing        GC DNA        Chlamydia DNA                  24-28 Weeks       Test Value Reference Range Date Time    HCT  36.0 % 35.0 - 48.0 24 1039    HGB  11.8 g/dL 12.0 - 16.0 24 1039    Platelets  294.0 10(3)uL 150.0 - 450.0 24 1039    GTT 1 Hr  104 mg/dL See comment 24 1039    Glucose Fasting        Glucose 1 Hr        Glucose 2 Hr        Glucose 3 Hr        TSH         Profile        Urine Culture        GC DNA        Chlamydia DNA                  35-37 Weeks       Test Value Reference Range Date Time    HCT        HGB        Platelets        TREP  Nonreactive  Nonreactive  24 1012    RPR ([facility])        Genital Group B Culture  Negative  Negative 24 0955     TSH        Urine Culture        HIV Combo  Non-Reactive  Non-Reactive 24 1012    GC DNA        Chlamydia DNA                  Optional Labs       Test Value Reference Range Date Time    HgB A1c  5.0 % <5.7 24 1039    HGB Electrophoresis  (See Report)    24 1039    Varicella Zoster        Cystic Fibrosis-Old         Cystic Fibrosis[32] (Required questions in OE to answer)        Cystic Fibrosis[165] (Required questions in OE to answer)        Cystic Fibrosis[165] (Required questions in OE to answer)        Cystic Fibrosis[165] (Required questions in OE to answer)        Sickle Cell        24Hr Urine Protein        24Hr Urine Creatinine        Parvo B19 IgM        Parvo B19 IgG                  Legend    ^: Historical                            Reviewed all prenatal ultrasounds    Admit labs pending    Assessment/Plan:    Rose Roblero is at an estimated gestational age of 37w1d with an estimated date of delivery of:  2024, by Ultrasound    PROM  Category 1 FHR tracing  Obstetrical history significant for  fundal height low for dates .    Admission problem(s):    Pregnancy (HCC)      Fundal height low for dates in third trimester (HCC)  Ultrasound  EFW 2626g/24%ile      Full-term premature rupture of membranes (HCC)   Mec stained fluid    Rose is a 33yo  at 37.1wks being admitted for PROM. Pregnancy history significant for fundal height low for dates with normal fetal growth. GBS negative   Admit to L&D   Recommend augmentation due to PROM of mec stained fluid. Discussed pitocin. Patient would like to avoid pitocin if possible. Discussed option of AROM of forebag to see if that initiates labor. Informed patient will need to proceed to pitocin if labor does not start after AROM of forebag and patient states understanding   Continuous monitoring   Anticipate vaginal delivery    Risks, benefits, alternatives and possible complications have been discussed in detail with the  patient.   Pre-admission, admission, and post admission procedures and expectations were discussed in detail.    All questions answered, all appropriate consents will be signed at the Hospital. Admission is planned for today.   Augment labor with AROM of forebag .    Mallory Khan CNM  7/13/2024  11:28 AM

## 2024-07-13 NOTE — L&D DELIVERY NOTE
Mookie Roblero [[id number]]      Labor Events     labor?: No  Rupture date/time: 2024 0915     Rupture type: SROM, AROM  Fluid color: Meconium  Labor type: Spontaneous Onset of Labor  Augmentation: AROM  Indications for augmentation: Ineffective Contraction Pattern  Intrapartum & labor complications: Variable decelerations, Late decelerations       Labor Event Times    Labor onset date/time: 2024 0915  Dilation complete date/time: 2024  Start pushing date/time: 2024 161       [place] Presentation    Presentation: Vertex  Position: Right Occiput Anterior       Operative Delivery    No data filed       Shoulder Dystocia    No data filed       Anesthesia    Method: None              [place] Delivery      Head delivery date/time: 2024 16:15:54   Delivery date/time:  24 16:16:06   Delivery type: Normal spontaneous vaginal delivery    Details:     Delivery location: delivery room  Delivery Room Temperature: 73       Delivery Providers    Delivering Clinician: Mallory Khan CNM   Delivery personnel:  Provider Role   Viviane Hidalgo, RN Baby Nurse   Jerrica Tucker RN Delivery Nurse   Sol Albert Surgical Tech             Cord    Time in sec: 120       Resuscitation    Method: None        Measurements      Weight: 2440 g 5 lb 6.1 oz Length: 45.7 cm     Head circum.: 31.5 cm              Placenta    Date/time: 2024  Removal: Spontaneous  Appearance: Intact  Disposition: Discarded       Apgars    Living status: Living   Apgar Scoring Key:    0 1 2    Skin color Blue or pale Acrocyanotic Completely pink    Heart rate Absent <100 bpm >100 bpm    Reflex irritability No response Grimace Cry or active withdrawal    Muscle tone Limp Some flexion Active motion    Respiratory effort Absent Weak cry; hypoventilation Good, crying              1 Minute:  5 Minute:  10 Minute:  15 Minute:  20 Minute:      Skin color: 0  1       Heart rate: 2  2       Reflex  irritablity: 2  2       Muscle tone: 2  2       Respiratory effort: 2  2       Total: 8  9          Apgars assigned by: ESPERANZA PEACOCK RN       Skin to Skin    Skin to skin initiated date/time: 7/13/2024 1616  Skin to skin with: Mother       Vaginal Count    Initial count RN: Jerrica Tucker, ALEXANDRA  Initial count Tech: Brown, Joya   Sponges   Sharps    Initial counts 10   0    Final counts 10   0    Final count RN: Jerrica Tucker RN  Final count MD: Mallory Khan CNM       Lacerations    Episiotomy: None  Perineal lacerations: None      Vaginal laceration?: No      Cervical laceration?: No    Clitoral laceration?: No    Quantitative blood loss (mL): 50              Rose progressed to complete dilatation and 0 station prior to pushing successfully to viable baby boy. See Delivery Summary for time, APGARs, and weight. Fetal head presented in the OA position and rotated to PAUL. Sweep for nuchal cord was performed and nuchal cord identified. Baby somersaulted through. Anterior shoulder delivered spontaneously without traction. Baby vigorous at birth. To maternal abdomen for dry and stimulation then cord cut after pulsing ceased. Prophylactic IV pitocin initiated in 3rd stage. Spontaneous placenta by ibarra mechanism, complete with 3 vessel cord. Hemostasis achieved by fundal massage and prophylactic IV Pitocin. Vagina and perineum inspected and intact. Mother and infant appeared in stable condition when midwife left the delivery room. Sharps and 4x4 counts were correct. Skin to skin initiated.    Quantitative Blood Loss (mL) 50

## 2024-07-13 NOTE — PROGRESS NOTES
Patient up to bathroom with assist x 2.  Voided 200cc at this time. Patient transferred to mother/baby room 359 per wheelchair in stable condition with baby and personal belongings.  Accompanied by significant other and staff.  Report given to mother/baby  [place] MORRIS.

## 2024-07-14 LAB
HCT VFR BLD AUTO: 38.3 %
HGB BLD-MCNC: 13 G/DL

## 2024-07-14 PROCEDURE — 85018 HEMOGLOBIN: CPT | Performed by: ADVANCED PRACTICE MIDWIFE

## 2024-07-14 PROCEDURE — 85014 HEMATOCRIT: CPT | Performed by: ADVANCED PRACTICE MIDWIFE

## 2024-07-14 NOTE — PROGRESS NOTES
[facility]  part of [facility]    OB/GYNE Progress Note      Rose Roblero Patient Status:  Inpatient    3/9/1990 MRN [id number]   Location [facility] 3SE Attending Mallory Khan, RAHEEM   Hosp Day # 1 PCP Tory Boyle MD        Assessment/Plan   Rose Roblero is a 34 year old , day 1 after a vaginal delivery  Breastfeeding without difficulty   Patient desiring discharge home today. Will await peds to see if baby is ok for discharge.  Postpartum teaching completed including danger signs and when to call the CNM      Pregnancy (HCC)   Delivered      Fundal height low for dates in third trimester ([facility])  Delivered      Full-term premature rupture of membranes ([facility])  Delivered      Vaginal delivery ([facility])   Normal PPD#1      Baby SGA   Informed her that peds will likely want baby to stay an additional day due to SGA        Discussed with: nurse     patient     Plan discussed with patient who verbalizes understanding and agreement.        Subjective   Currently she denies excessive bleeding or pain. No clots. Denies SOB, chest pain, HA, dizziness. + void. + stool  Support at home: spouse  Has car seat       Review of Systems:  Constitutional: Denies   Genitourinary: Denies   Respiratory: Denies   Psychiatric: Denies         Objective   Vital signs in last 24 hours:  Temp:  [98 °F (36.7 °C)-98.8 °F (37.1 °C)] 98.2 °F (36.8 °C)  Pulse:  [53-81] 58  Resp:  [15-18] 16  BP: (101-119)/(68-90) 110/78    Input/Output:    Intake/Output Summary (Last 24 hours) at 2024 0913  Last data filed at 2024 2140  Gross per 24 hour   Intake --   Output 650 ml   Net -650 ml       Weight (Last 6):  Wt Readings from Last 6 Encounters:   24 145 lb (65.8 kg)   24 144 lb (65.3 kg)   24 145 lb (65.8 kg)   24 144 lb (65.3 kg)   24 142 lb (64.4 kg)   24 140 lb 9.6 oz (63.8 kg)     Body mass index is 26.52 kg/m².     Exam:  Constitutional: Comfortable and  bonding well with infant   Uterus: Fundus firm, below umbilicus   Wound/Incision: Well-approximated, no swelling or edema, small lochia rubra, no clots   Respiratory: Unlabored respirations   Extremities: No edema. No evidence of DVT on exam   Psychiatric: Calm affect, appropriate     DVT Risk Score: Low risk        Results:     Lab Results   Component Value Date    TREPONEMALAB Nonreactive 07/13/2024    ABO O 07/13/2024    RH Positive 07/13/2024    WBC 4.9 07/13/2024    HGB 13.0 07/14/2024    HCT 38.3 07/14/2024    .0 07/13/2024    CREATSERUM 0.65 11/06/2020    BUN 10 11/06/2020     11/06/2020    K 3.5 11/06/2020     11/06/2020    CO2 26.0 11/06/2020    GLU 72 11/06/2020    CA 9.2 11/06/2020    ALB 4.0 11/06/2020    ALKPHO 52 11/06/2020    BILT 0.5 11/06/2020    TP 8.9 (H) 11/06/2020    AST 16 11/06/2020    ALT 26 11/06/2020    TSH 1.600 05/18/2019       Lab Results   Component Value Date    COLORUR Yellow 11/06/2020    CLARITY Clear 11/06/2020    SPECGRAVITY 1.026 11/06/2020    PROUR 30 (A) 11/06/2020    GLUUR Negative 11/06/2020    KETUR Trace (A) 11/06/2020    BILUR Negative 11/06/2020    BLOODURINE Negative 11/06/2020    NITRITE Negative 11/06/2020    UROBILINOGEN <2.0 11/06/2020    LEUUR Negative 11/06/2020    UASA Negative 04/04/2019       No results found.          Mallory Khan CNM  7/14/2024  9:13 AM

## 2024-07-14 NOTE — PLAN OF CARE
Problem: Patient Centered Care  Goal: Patient preferences are identified and integrated in the patient's plan of care  Description: Interventions:  - Provide timely, complete, and accurate information to patient/family  - Incorporate patient and family knowledge, values, beliefs, and cultural backgrounds into the planning and delivery of care  - Encourage patient/family to participate in care and decision-making at the level they choose  - Honor patient and family perspectives and choices  Outcome: Progressing     Problem: BIRTH - VAGINAL/ SECTION  Goal: Fetal and maternal status remain reassuring during the birth process  Description: INTERVENTIONS:  - Monitor vital signs  - Monitor fetal heart rate  - Monitor uterine activity  - Monitor labor progression (vaginal delivery)  - DVT prophylaxis (C/S delivery)  - Surgical antibiotic prophylaxis (C/S delivery)  Outcome: Progressing     Problem: PAIN - ADULT  Goal: Verbalizes/displays adequate comfort level or patient's stated pain goal  Description: INTERVENTIONS:  - Encourage pt to monitor pain and request assistance  - Assess pain using appropriate pain scale  - Administer analgesics based on type and severity of pain and evaluate response  - Implement non-pharmacological measures as appropriate and evaluate response  - Consider cultural and social influences on pain and pain management  - Manage/alleviate anxiety  - Utilize distraction and/or relaxation techniques  - Monitor for opioid side effects  - Notify MD/LIP if interventions unsuccessful or patient reports new pain  - Anticipate increased pain with activity and pre-medicate as appropriate  Outcome: Progressing     Problem: ANXIETY  Goal: Will report anxiety at manageable levels  Description: INTERVENTIONS:  - Administer medication as ordered  - Teach and rehearse alternative coping skills  - Provide emotional support with 1:1 interaction with staff  Outcome: Progressing    Problem: POSTPARTUM  Goal:  Long Term Goal:Experiences normal postpartum course  Description: INTERVENTIONS:  - Assess and monitor vital signs and lab values.  - Assess fundus and lochia.  - Provide ice/sitz baths for perineum discomfort.  - Monitor healing of incision/episiotomy/laceration, and assess for signs and symptoms of infection and hematoma.  - Assess bladder function and monitor for bladder distention.  - Provide/instruct/assist with pericare as needed.  - Provide VTE prophylaxis as needed.  - Monitor bowel function.  - Encourage ambulation and provide assistance as needed.  - Assess and monitor emotional status and provide social service/psych resources as needed.  - Utilize standard precautions and use personal protective equipment as indicated. Ensure aseptic care of all intravenous lines and invasive tubes/drains.  - Obtain immunization and exposure to communicable diseases history.  Outcome: Progressing  Goal: Optimize infant feeding at the breast  Description: INTERVENTIONS:  - Initiate breast feeding within first hour after birth.   - Monitor effectiveness of current breast feeding efforts.  - Assess support systems available to mother/family.  - Identify cultural beliefs/practices regarding lactation, letdown techniques, maternal food preferences.  - Assess mother's knowledge and previous experience with breast feeding.  - Provide information as needed about early infant feeding cues (e.g., rooting, lip smacking, sucking fingers/hand) versus late cue of crying.  - Discuss/demonstrate breast feeding aids (e.g., infant sling, nursing footstool/pillows, and breast pumps).  - Encourage mother/other family members to express feelings/concerns, and actively listen.  - Educate father/SO about benefits of breast feeding and how to manage common lactation challenges.  - Recommend avoidance of specific medications or substances incompatible with breast feeding.  - Assess and monitor for signs of nipple pain/trauma.  - Instruct and  provide assistance with proper latch.  - Review techniques for milk expression (breast pumping) and storage of breast milk. Provide pumping equipment/supplies, instructions and assistance, as needed.  - Encourage rooming-in and breast feeding on demand.  - Encourage skin-to-skin contact.  - Provide LC support as needed.  - Assess for and manage engorgement.  - Provide breast feeding education handouts and information on community breast feeding support.   Outcome: Progressing  Goal: Establishment of adequate milk supply with medication/procedure interruptions  Description: INTERVENTIONS:  - Review techniques for milk expression (breast pumping).   - Provide pumping equipment/supplies, instructions, and assistance until it is safe to breastfeed infant.  Outcome: Progressing  Goal: Experiences normal breast weaning course  Description: INTERVENTIONS:  - Assess for and manage engorgement.  - Instruct on breast care.  - Provide comfort measures.  Outcome: Progressing  Goal: Appropriate maternal -  bonding  Description: INTERVENTIONS:  - Assess caregiver- interactions.  - Assess caregiver's emotional status and coping mechanisms.  - Encourage caregiver to participate in  daily care.  - Assess support systems available to mother/family.  - Provide /case management support as needed.  Outcome: Progressing

## 2024-07-15 VITALS
BODY MASS INDEX: 26.68 KG/M2 | DIASTOLIC BLOOD PRESSURE: 82 MMHG | SYSTOLIC BLOOD PRESSURE: 123 MMHG | TEMPERATURE: 97 F | HEIGHT: 62 IN | RESPIRATION RATE: 15 BRPM | WEIGHT: 145 LBS | HEART RATE: 58 BPM

## 2024-07-15 NOTE — DISCHARGE INSTRUCTIONS
Call your healthcare provider right away:   -fever over 100.4  -heavy vaginal bleeding   -severe or worsening pain unrelieved by medication   -severe anxiety or depression  - Dizziness and/or changes in vision  - severe headache,   - calf pain or swelling  - Chest pain or shortness of breath.     You are on pelvic rest for 6 weeks. This means nothing in vagina (no intercourse, tampons, etc.) and no baths or swimming pool.    No heavy lifting (no more than the baby) until cleared by OB.   Discharge Instructions for Vaginal Delivery  Follow-up  Schedule a  follow-up exam with the midwife who delivered you in 2 weeks and 6 weeks. Please remember to call as soon as possible for this appointment. After having a baby, your body may be very tired. It can take time to recover from a vaginal delivery. Please remember this and call if you have any questions or concerns before your 6 week appointment.   Medications    Please take Motrin at home for pain control 600mg every 6 hours as needed  Continue taking your Prenatal Vitamin daily, as long as you are nursing  Ferrous Sulfate 325 mg once every other day (may obtain in form of Gentle Iron, Slow FE, or liquid Floradix)  Vitamin D3 2000 IU daily  Colace (stool softener)  once or twice per day as needed  If you have stitches  You may have received stitches in the skin near your vagina. The stitches might have closed an episiotomy (an incision that enlarges the opening of the vagina). Or you may have needed stitches to repair torn skin. Either way, your stitches should dissolve within weeks. Until then, you can help reduce discomfort, aid healing, and reduce your risk of infection by keeping the stitches clean. These tips can help:  Gently wipe from front to back after you urinate or have a bowel movement.  After wiping, spray warm water on the area. Or you can have a sitz bath. This means sitting in a tub with a few inches of water in it. Then pat the area dry or use a  hairdryer on a cool setting.  You can take a shower unless told not to.  Change sanitary pads at least every 2 to 4 hours.  Place cold packs as need, but remember to keep a thin towel between the pack and your skin.  Activity  Here are some suggestions:  Get lots of rest. Take naps as often as your can.   Increase your activities gradually.   Don’t have sexual intercourse until after you’ve had a follow-up appointment and you have decided on a birth control method.  Remember your are on pelvic rest, so nothing in your vagina (tampons etc...), no tub baths, and no swimming pools.       When to call your healthcare provider  Call your health care provider right away if you have:  A fever of 100.4°F (38.0°C) or higher  Bleeding that requires a new sanitary pad after an hour, or large blood clots. Remember your bleeding will wax and wane. Please call if you are consistently saturating a pad and hour.   Pain in your vagina that gets worse and isn't relieved with medicine.  Burning, pain, red streaks, or lumpy areas in your breasts that may be accompanied by flu-like symptoms  Nausea or vomiting  Dizziness or fainting  Feelings of extreme sadness or anxiety, or a feeling that you don’t want to be with your baby  Abdominal pain that isn’t relieved with medicine  No bowel movement for 5 days  Redness, warmth, or pain in the lower leg  Chest pain

## 2024-07-15 NOTE — DISCHARGE SUMMARY
[facility]  part of [facility]    Discharge Summary    Rose Roblero Patient Status:  Inpatient    3/9/1990 MRN [id number]   Location [facility] 3SE Attending Mallory Khan CNM   Hosp Day # 2       Delivering OB Clinician: Mallory Khan CNM    EDC: Estimated Date of Delivery: 24    Gestational Age: 37w1d    Antepartum complications:   Patient Active Problem List   Diagnosis    Pregnancy ([facility])    Pelvic pain affecting pregnancy in second trimester, antepartum ([facility])    Tetanus, diphtheria, and acellular pertussis (Tdap) vaccination declined    Fundal height low for dates in third trimester ([facility])    Full-term premature rupture of membranes ([facility])    Ruptured, membranes, premature ([facility])    Vaginal delivery ([facility])    History of prior pregnancy with SGA        Date of Delivery: 2024  Time of Delivery: 4:16 PM     Delivery Type: spontaneous vaginal delivery    Baby: Liveborn male   Information for the patient's :  Mookie Roblero [[id number]]   5 lb 6.1 oz (2.44 kg)   Apgars:  1 minute: 8   5 minutes: 9 10 minutes:       Intrapartum Complications: None    Admit Date: 2024    Discharge Date: 7/15/2024    Hospital Course: No complications Routine delivery and postpartum care    Discharged Condition: stable    Disposition: home    Plan:     Follow-up appointment in 2 weeks with RAHEEM Mckeon CNM  7/15/2024  3:20 PM

## 2024-07-15 NOTE — PLAN OF CARE
Problem: POSTPARTUM  Goal: Long Term Goal:Experiences normal postpartum course  Description: INTERVENTIONS:  - Assess and monitor vital signs and lab values.  - Assess fundus and lochia.  - Provide ice/sitz baths for perineum discomfort.  - Monitor healing of incision/episiotomy/laceration, and assess for signs and symptoms of infection and hematoma.  - Assess bladder function and monitor for bladder distention.  - Provide/instruct/assist with pericare as needed.  - Provide VTE prophylaxis as needed.  - Monitor bowel function.  - Encourage ambulation and provide assistance as needed.  - Assess and monitor emotional status and provide social service/psych resources as needed.  - Utilize standard precautions and use personal protective equipment as indicated. Ensure aseptic care of all intravenous lines and invasive tubes/drains.  - Obtain immunization and exposure to communicable diseases history.  Outcome: Progressing  Goal: Optimize infant feeding at the breast  Description: INTERVENTIONS:  - Initiate breast feeding within first hour after birth.   - Monitor effectiveness of current breast feeding efforts.  - Assess support systems available to mother/family.  - Identify cultural beliefs/practices regarding lactation, letdown techniques, maternal food preferences.  - Assess mother's knowledge and previous experience with breast feeding.  - Provide information as needed about early infant feeding cues (e.g., rooting, lip smacking, sucking fingers/hand) versus late cue of crying.  - Discuss/demonstrate breast feeding aids (e.g., infant sling, nursing footstool/pillows, and breast pumps).  - Encourage mother/other family members to express feelings/concerns, and actively listen.  - Educate father/SO about benefits of breast feeding and how to manage common lactation challenges.  - Recommend avoidance of specific medications or substances incompatible with breast feeding.  - Assess and monitor for signs of nipple  pain/trauma.  - Instruct and provide assistance with proper latch.  - Review techniques for milk expression (breast pumping) and storage of breast milk. Provide pumping equipment/supplies, instructions and assistance, as needed.  - Encourage rooming-in and breast feeding on demand.  - Encourage skin-to-skin contact.  - Provide LC support as needed.  - Assess for and manage engorgement.  - Provide breast feeding education handouts and information on community breast feeding support.   Outcome: Progressing  Goal: Establishment of adequate milk supply with medication/procedure interruptions  Description: INTERVENTIONS:  - Review techniques for milk expression (breast pumping).   - Provide pumping equipment/supplies, instructions, and assistance until it is safe to breastfeed infant.  Outcome: Progressing  Goal: Experiences normal breast weaning course  Description: INTERVENTIONS:  - Assess for and manage engorgement.  - Instruct on breast care.  - Provide comfort measures.  Outcome: Progressing  Goal: Appropriate maternal -  bonding  Description: INTERVENTIONS:  - Assess caregiver- interactions.  - Assess caregiver's emotional status and coping mechanisms.  - Encourage caregiver to participate in  daily care.  - Assess support systems available to mother/family.  - Provide /case management support as needed.  Outcome: Progressing     Problem: Patient/Family Goals  Goal: Patient/Family Long Term Goal  Description: Patient's Long Term Goal:     Interventions:  -   - See additional Care Plan goals for specific interventions  Outcome: Progressing  Goal: Patient/Family Short Term Goal  Description: Patient's Short Term Goal:     Interventions:   -   - See additional Care Plan goals for specific interventions  Outcome: Progressing     Problem: Patient Centered Care  Goal: Patient preferences are identified and integrated in the patient's plan of care  Description: Interventions:  - What  would you like us to know as we care for you?   - Provide timely, complete, and accurate information to patient/family  - Incorporate patient and family knowledge, values, beliefs, and cultural backgrounds into the planning and delivery of care  - Encourage patient/family to participate in care and decision-making at the level they choose  - Honor patient and family perspectives and choices  Outcome: Progressing

## 2024-07-15 NOTE — PROGRESS NOTES
[facility]  part of [facility]    OB/GYNE Progress Note      Rose Roblero Patient Status:  Inpatient    3/9/1990 MRN [id number]   Location [facility] 3SE Attending Mallory Khan CNM   Hosp Day # 2 PCP Tory Boyle MD     Assessment & Plan:     Pregnancy (HCC)        Fundal height low for dates in third trimester (HCC)        Full-term premature rupture of membranes (HCC)        Ruptured, membranes, premature (HCC)        Vaginal delivery (HCC)  Stable for discharge home      History of prior pregnancy with SGA             Discussed with:      nurse     patient     Plan discussed with patient who verbalizes understanding and agreement.    Subjective:       Review of Systems:    Constitutional: feeling well, pain free, good appetite, and lochia present    Voiding freely  Sad about infant in NICU    Objective:   Vital signs in last 24 hours:  Temp:  [97.4 °F (36.3 °C)-98.4 °F (36.9 °C)] 97.4 °F (36.3 °C)  Pulse:  [58-65] 58  Resp:  [15-16] 15  BP: (109-123)/(81-82) 123/82    Input/Output:  No intake or output data in the 24 hours ending 07/15/24 1516    Weight (Last 6):  Wt Readings from Last 6 Encounters:   24 145 lb (65.8 kg)   24 144 lb (65.3 kg)   24 145 lb (65.8 kg)   24 144 lb (65.3 kg)   24 142 lb (64.4 kg)   24 140 lb 9.6 oz (63.8 kg)     Body mass index is 26.52 kg/m².     Exam:       Constitutional: comfortable and appears rested  Uterus: enlarged, fundus firm, and fundus below umbilicus  Lochia: small rubra  Perineum: without swelling    Results:   Lab Results   Component Value Date    TREPONEMALAB Nonreactive 2024    ABO O 2024    RH Positive 2024    WBC 4.9 2024    HGB 13.0 2024    HCT 38.3 2024    .0 2024    CREATSERUM 0.65 2020    BUN 10 2020     2020    K 3.5 2020     2020    CO2 26.0 2020    GLU 72 2020    CA 9.2  11/06/2020    ALB 4.0 11/06/2020    ALKPHO 52 11/06/2020    BILT 0.5 11/06/2020    TP 8.9 (H) 11/06/2020    AST 16 11/06/2020    ALT 26 11/06/2020    TSH 1.600 05/18/2019       Lab Results   Component Value Date    COLORUR Yellow 11/06/2020    CLARITY Clear 11/06/2020    SPECGRAVITY 1.026 11/06/2020    PROUR 30 (A) 11/06/2020    GLUUR Negative 11/06/2020    KETUR Trace (A) 11/06/2020    BILUR Negative 11/06/2020    BLOODURINE Negative 11/06/2020    NITRITE Negative 11/06/2020    UROBILINOGEN <2.0 11/06/2020    LEUUR Negative 11/06/2020    UASA Negative 04/04/2019       No results found.         Nevaeh Carroll CNM  7/15/2024  3:16 PM

## 2024-07-29 ENCOUNTER — TELEMEDICINE (OUTPATIENT)
Dept: OBGYN CLINIC | Facility: CLINIC | Age: 34
End: 2024-07-29

## 2024-07-29 NOTE — PROGRESS NOTES
This visit is conducted using Telemedicine with live, interactive video and audio.    Patient has been referred to [facility] website at [email or web address] to review the yearly Consent to Treat document.    Patient understands and accepts financial responsibility for any deductible, co-insurance, and/or co-pays associated with this service.     Subjective: Rose is 2 weeks postpartum after a vaginal delivery of a baby boy.  See L&D delivery summary for birth statistics.  She is without concerns today. Bleeding is decreasing and not experiencing any excessive pain.    Breastfeeding successfully and reports infant is experiencing adequate weight gain. Has concern for oversupply due to pumping. She was pumping since baby was in NICU. She reports yesterday she stopped pumping because her nipples were getting sore. She is now feeling like baby is not completely emptying breast.     She denies any signs/symptoms of postpartum depression and has adequate family support.  Denies any abuse.    Contraceptive plan: NFP    Review of Systems   Constitutional:  Negative for fever.   Genitourinary:  Negative for dysuria, frequency and urgency.   Psychiatric/Behavioral: Negative.  Negative for depression. The patient is not nervous/anxious.        Objective:   There were no vitals filed for this visit.  Wt Readings from Last 6 Encounters:   07/13/24 145 lb (65.8 kg)   07/09/24 144 lb (65.3 kg)   06/25/24 145 lb (65.8 kg)   06/12/24 144 lb (65.3 kg)   05/30/24 142 lb (64.4 kg)   05/08/24 140 lb 9.6 oz (63.8 kg)       Physical Exam  Constitutional:       General: She is not in acute distress.     Appearance: Normal appearance. She is not ill-appearing, toxic-appearing or diaphoretic.   Pulmonary:      Effort: Pulmonary effort is normal.   Neurological:      Mental Status: She is alert and oriented to person, place, and time.   Psychiatric:         Mood and Affect: Mood normal.         Thought Content: Thought content normal.          Judgment: Judgment normal.          Assessment/Plan:   2 weeks postpartum, stable. Breast feeding without difficulty. Recommend cold packs and breastfeeding on demand. Call with red/hot areas of breast or fever  Contraception: NFP  Return to clinic: 6 week postpartum visit    Counseling included:   Signs/symptoms of postpartum depression  Postpartum danger signs  Lactation support and troubleshooting  Maternal and family adaption  Sleep/rest strategies  Sexuality  Infant safety and care  Contraception    Rose verbalized understanding of plan of care. All questions answered. No barriers to learning identified.

## 2024-08-14 ENCOUNTER — TELEPHONE (OUTPATIENT)
Dept: OBGYN UNIT | Facility: HOSPITAL | Age: 34
End: 2024-08-14

## 2024-08-26 ENCOUNTER — POSTPARTUM (OUTPATIENT)
Dept: OBGYN CLINIC | Facility: CLINIC | Age: 34
End: 2024-08-26

## 2024-08-26 VITALS
BODY MASS INDEX: 24 KG/M2 | WEIGHT: 133 LBS | DIASTOLIC BLOOD PRESSURE: 72 MMHG | SYSTOLIC BLOOD PRESSURE: 108 MMHG | HEART RATE: 79 BPM

## 2024-08-26 DIAGNOSIS — M62.08 DIASTASIS RECTI: ICD-10-CM

## 2024-08-26 DIAGNOSIS — O92.79: ICD-10-CM

## 2024-08-26 DIAGNOSIS — R53.83 FATIGUE, UNSPECIFIED TYPE: ICD-10-CM

## 2024-08-26 DIAGNOSIS — N89.8 VAGINAL ODOR: ICD-10-CM

## 2024-08-26 PROBLEM — O26.892 PELVIC PAIN AFFECTING PREGNANCY IN SECOND TRIMESTER, ANTEPARTUM (HCC): Status: RESOLVED | Noted: 2024-04-10 | Resolved: 2024-08-26

## 2024-08-26 PROBLEM — R10.2 PELVIC PAIN AFFECTING PREGNANCY IN SECOND TRIMESTER, ANTEPARTUM (HCC): Status: RESOLVED | Noted: 2024-04-10 | Resolved: 2024-08-26

## 2024-08-26 PROBLEM — O42.92 FULL-TERM PREMATURE RUPTURE OF MEMBRANES (HCC): Status: RESOLVED | Noted: 2024-07-13 | Resolved: 2024-08-26

## 2024-08-26 PROBLEM — O26.843 FUNDAL HEIGHT LOW FOR DATES IN THIRD TRIMESTER (HCC): Status: RESOLVED | Noted: 2024-07-09 | Resolved: 2024-08-26

## 2024-08-26 PROBLEM — O42.90 RUPTURED, MEMBRANES, PREMATURE (HCC): Status: RESOLVED | Noted: 2024-07-13 | Resolved: 2024-08-26

## 2024-08-26 PROBLEM — Z34.90 PREGNANCY (HCC): Status: RESOLVED | Noted: 2024-04-10 | Resolved: 2024-08-26

## 2024-08-26 NOTE — PATIENT INSTRUCTIONS
Midwifery and Women's Health  6 Weeks Postpartum and beyond    Congratulations on your beautiful baby!  You have probably had many joyful moments (and maybe some challenging ones) in the first 6 weeks of your baby's life.  The first year postpartum brings many changes physically and emotionally, and can also be a time when women struggle to find time to care for themselves and their own health.     The following are a few suggestions to help you achieve optimal health.  Remember that some days are easier than others when you are caring for a little person, and this is not meant to be a TO DO list that you must tackle immediately!  Be gentle with yourself, and try to take little steps to tend to your physical and mental health in the months ahead  1.  Try to incorporate 20-30 minutes of physical activity into every day.  Fast walking, yoga, swimming, or jumping rope at home are all great examples.  The World Health Organization recommends at least 150 minutes of moderate aerobic exercise per week.   Exercise also releases endorphins, which promote improved mood.  2.  Muscle strength matters   Strengthening of your core and pelvic floor is important, and can prevent problems in the future such as loss of urine or feces, uterine prolapse and chronic back pain.  We can provide you with additional guidance about exercises to help with these muscle groups.   Some women benefit from physical therapy.  3.  Expect weight loss to happen slowly, over the course of the first postpartum year.  The rate of weight loss is different for every woman, depending on whether one is breastfeeding, level of physical activity, and metabolic rate.  Ideally a woman should attain a Body Mass Index (BMI) or 25 or less before planning her next pregnancy, but even a modest loss of 10% for overweight women is beneficial.     4.  Talk to your midwife about any health conditions you may need to address before considering another pregnancy.   Certain complications that occur during a pregnancy can cause long-term health problems or can recur in future pregnancies.  Some of these can be prevented by taking action BEFORE the next pregnancy.  If you had diabetes, blood pressure issues, anemia, or obesity in your pregnancy, make a plan with the midwife about what to do at this point to minimize risks in the future.  5.  Catch up on your vaccines.  Talk to the midwife about catching up on your HPV vaccine series if you have not completed this, and if seasonally appropriate, get your FLU vaccine.     6.  Consider your long term family plan and pregnancy spacing.   Research has shown that waiting at least 18 months from the birth of one child to the next pregnancy results in healthier, lower risk pregnancies.  Remember that you will probably ovulate and be fertile BEFORE your first period returns.  Talk to the midwives about the best family planning method for you... There is something for everyone.  The midwives are here for you in the weeks, months, and years to come!

## 2024-08-26 NOTE — PROGRESS NOTES
Chief Complaint   Patient presents with    Postpartum Care     6 weeks pp, pt is slightly spotting. Birthcontrol method will be condoms.   Christo-7=3          HPI:   Rose is 34 year old , here today for 6-week postpartum visit.  Type and date of Delivery: vaginal, 24, Complications: uncomplicated  See Delivery Summary for baby's gender and weight  Perineum: Intact. Reports still having light spotting. Has odor. Bleeding had stopped for a week and then spotting started two weeks ago. Denies pelvic pain.  Feeding Method: Pumping and feeding. Supplementing with formula. Not producing enough.   Bonding: well bonded  Maternal sleep: 8 hours/night. Napping: no  Sexual activity resumed: no. Contraceptive Method: condoms and cycle tracking  Postpartum Depression screen: GAD7=3; EPDS=0    Pt offered for MA to be present during exam and patient declines      HISTORY:  Patient Active Problem List   Diagnosis    Pregnancy (HCC)    Pelvic pain affecting pregnancy in second trimester, antepartum ([facility])    Tetanus, diphtheria, and acellular pertussis (Tdap) vaccination declined    Fundal height low for dates in third trimester ([facility])    Full-term premature rupture of membranes (HCC)    Ruptured, membranes, premature (HCC)    Vaginal delivery (HCC)    History of prior pregnancy with SGA        Medications (Active prior to today's visit):  Current Outpatient Medications   Medication Sig Dispense Refill    prenatal vitamin with DHA 27-0.8-228 MG Oral Cap Take 1 capsule by mouth daily.         Allergies:   No Known Allergies    PHYSICAL EXAM:   Vitals:    24 0901   BP: 108/72   Pulse: 79       Pre-pregnancy 20.85  Today's Body mass index is 24.33 kg/m².    Pre-pregnancy weight 114 lbs  Today's weight   Wt Readings from Last 6 Encounters:   24 133 lb (60.3 kg)   24 145 lb (65.8 kg)   24 144 lb (65.3 kg)   24 145 lb (65.8 kg)   24 144 lb (65.3 kg)   24 142 lb (64.4 kg)     lbs    Physical Exam  Vitals and nursing note reviewed.   Constitutional:       General: She is not in acute distress.     Appearance: She is normal weight. She is not ill-appearing, toxic-appearing or diaphoretic.   Cardiovascular:      Pulses: Normal pulses.   Pulmonary:      Effort: Pulmonary effort is normal.   Abdominal:      General: Abdomen is flat.      Palpations: Abdomen is soft.      Comments: 2-3 finger width separation of abdominal muscles   Genitourinary:     General: Normal vulva.      Labia:         Right: No rash, tenderness, lesion or injury.         Left: No rash, tenderness, lesion or injury.       Vagina: No signs of injury and foreign body. Vaginal discharge (brown) present. No erythema, tenderness, bleeding, lesions or prolapsed vaginal walls.      Cervix: No cervical motion tenderness, discharge, friability, lesion, erythema, cervical bleeding or eversion.      Uterus: Not deviated, not enlarged, not fixed, not tender and no uterine prolapse.       Adnexa:         Right: No mass, tenderness or fullness.          Left: No mass, tenderness or fullness.     Neurological:      Mental Status: She is alert and oriented to person, place, and time.   Psychiatric:         Mood and Affect: Mood normal.         Behavior: Behavior normal.         Thought Content: Thought content normal.         Judgment: Judgment normal.            No results found for this or any previous visit (from the past 24 hour(s)).       ASSESSMENT/PLAN:   Rose was seen today for postpartum care.    Diagnoses and all orders for this visit:    Postpartum care and examination (HCC)    Fatigue, unspecified type  -     TSH and Free T4; Future    Inadequate milk production (HCC)  -     TSH and Free T4; Future  -     CBC, Platelet; No Differential; Future    Vaginal odor  -     Vaginitis Vaginosis PCR Panel; Future  -     Vaginitis Vaginosis PCR Panel          CBC and TSH ordered due to low milk supply.  Vaginal culture collected  due to continued spotting and odor. Will treat if indicated. If spotting still present in 2 weeks, will plan for ultrasound    Next annual due: 3-6 months    Counseling:   Postpartum teaching including maternal and family adaptation, sleep/rest strategies, lactation and weaning (if applicable), reaching ideal body weight, sexuality/contraception, importance of Kegel's and abdominal exercises, continuation of prenatal vitamins, and signs/symptoms of postpartum depression  Patient verbalized understanding, All questions answered. No barriers to learning identified

## 2024-08-27 LAB
BV BACTERIA DNA VAG QL NAA+PROBE: POSITIVE
C GLABRATA DNA VAG QL NAA+PROBE: NEGATIVE
C KRUSEI DNA VAG QL NAA+PROBE: NEGATIVE
CANDIDA DNA VAG QL NAA+PROBE: NEGATIVE
T VAGINALIS DNA VAG QL NAA+PROBE: NEGATIVE

## 2024-08-27 RX ORDER — METRONIDAZOLE 7.5 MG/G
1 GEL VAGINAL NIGHTLY
Qty: 70 G | Refills: 0 | Status: SHIPPED | OUTPATIENT
Start: 2024-08-27 | End: 2024-09-01

## 2024-09-19 ENCOUNTER — LAB ENCOUNTER (OUTPATIENT)
Dept: LAB | Age: 34
End: 2024-09-19
Attending: ADVANCED PRACTICE MIDWIFE
Payer: COMMERCIAL

## 2024-09-19 DIAGNOSIS — R53.83 FATIGUE, UNSPECIFIED TYPE: ICD-10-CM

## 2024-09-19 DIAGNOSIS — O92.79: ICD-10-CM

## 2024-09-19 LAB
DEPRECATED RDW RBC AUTO: 38.3 FL (ref 35.1–46.3)
ERYTHROCYTE [DISTWIDTH] IN BLOOD BY AUTOMATED COUNT: 11.3 % (ref 11–15)
HCT VFR BLD AUTO: 39 %
HGB BLD-MCNC: 13.2 G/DL
MCH RBC QN AUTO: 31.4 PG (ref 26–34)
MCHC RBC AUTO-ENTMCNC: 33.8 G/DL (ref 31–37)
MCV RBC AUTO: 92.9 FL
PLATELET # BLD AUTO: 387 10(3)UL (ref 150–450)
RBC # BLD AUTO: 4.2 X10(6)UL
T4 FREE SERPL-MCNC: 1.2 NG/DL (ref 0.8–1.7)
TSI SER-ACNC: 2.14 MIU/ML (ref 0.55–4.78)
WBC # BLD AUTO: 5.4 X10(3) UL (ref 4–11)

## 2024-09-19 PROCEDURE — 85027 COMPLETE CBC AUTOMATED: CPT

## 2024-09-19 PROCEDURE — 84439 ASSAY OF FREE THYROXINE: CPT

## 2024-09-19 PROCEDURE — 36415 COLL VENOUS BLD VENIPUNCTURE: CPT

## 2024-09-19 PROCEDURE — 84443 ASSAY THYROID STIM HORMONE: CPT

## 2025-06-30 ENCOUNTER — OFFICE VISIT (OUTPATIENT)
Dept: OBGYN CLINIC | Facility: CLINIC | Age: 35
End: 2025-06-30
Payer: COMMERCIAL

## 2025-06-30 VITALS
HEIGHT: 64 IN | WEIGHT: 134 LBS | BODY MASS INDEX: 22.88 KG/M2 | SYSTOLIC BLOOD PRESSURE: 99 MMHG | HEART RATE: 74 BPM | DIASTOLIC BLOOD PRESSURE: 65 MMHG

## 2025-06-30 DIAGNOSIS — N92.6 MISSED MENSES: Primary | ICD-10-CM

## 2025-06-30 DIAGNOSIS — N92.6 IRREGULAR MENSES: ICD-10-CM

## 2025-06-30 LAB
CONTROL LINE PRESENT WITH A CLEAR BACKGROUND (YES/NO): YES YES/NO
KIT LOT #: NORMAL NUMERIC
PREGNANCY TEST, URINE: POSITIVE

## 2025-06-30 NOTE — PROGRESS NOTES
CHIEF COMPLAINT:  Chief Complaint   Patient presents with    Gyn Exam     Missed menses  Some spotting a few weeks ago        HPI:  Rose is 35 year old, female, here today for a missed menses visit.  Currently, she is feeling well. Had some spotting about 2 weeks ago. Spotting was brownish and after intercourse. Denies pelvic pain.    Patient's last menstrual period was 2025 (exact date).  Menarche: 13  Period Cycle (Days): 28-30  Period Duration (Days): 14  Period Flow: Heavy  Use of Birth Control (if yes, specify type): None  Hx Prior Abnormal Pap: No  Pap Date: 23  Pap Result Notes: WNL         LMP 25 --> 10.5wks --> PRUDENCIO 26  Had one period prior to this one due to breastfeeding.  Believes conception was closer to 25    Family H/O of genetic disorders: no  Cats at home: no  Occupational hazzards: no  H/O of STIs: no  H/O of chicken pox or vaccine: chicken pox and been vaccinated  Exercise: no  History of MRSA or other difficult to treat infections: no  Recent Travel outside [place] no    HISTORY:  Past Medical History[1]   Past Surgical History[2]   Family History[3]   Social History: Short Social Hx on File[4]    Safe relationship/safe home environment      Medications (Active prior to today's visit):  Current Medications[5]    Allergies:  Allergies[6]    REVIEW OF SYSTEMS:  Review of Systems   All other systems reviewed and are negative.       PHYSICAL EXAM:  Vitals:    25 1105   BP: 99/65   Pulse: 74     Physical Exam  Vitals and nursing note reviewed.   Constitutional:       General: She is not in acute distress.     Appearance: Normal appearance. She is not ill-appearing, toxic-appearing or diaphoretic.   Pulmonary:      Effort: Pulmonary effort is normal.   Neurological:      Mental Status: She is alert and oriented to person, place, and time.   Psychiatric:         Mood and Affect: Mood normal.         Behavior: Behavior normal.         Thought Content: Thought  content normal.         Judgment: Judgment normal.          Recent Results (from the past 24 hours)   Urine Pregnancy Test    Collection Time: 25 11:25 AM   Result Value Ref Range    Pregnancy Test, Urine POSITIVE     Control Line Present with a clear background (yes/no) YES Yes/No    Kit Lot # [id number] Numeric    Kit Expiration Date 2026 Date        ASSESSMENT/PLAN:   Rose was seen today for gyn exam.    Diagnoses and all orders for this visit:    Missed menses  -     Urine Pregnancy Test    Lactating mother (HCC)  -     US PREG 1ST TRIMESTER (CPT=76801); Future    Irregular menses  -     US PREG 1ST TRIMESTER (CPT=76801); Future         Today's UCG positive result reviewed with patient  Appropriate for CNM care   Taking prenatal vitamins      Pre-eclampsia Risk Assessment:   High Risk Factors (any 1 of below): none    Moderate Risk Factors (any 2 of below) history of SGA     Next visit: Patient to schedule Nurse Education visit, next available, and NOB for 12wga    Counseling included:  -- CNM care, philosophy, and protocols  -- Discussed importance of folic acid, calcium, vitamin D  -- Reviewed pregnancy recommendations regarding weight gain, diet/hydration, and food safety  -- Travel discussed, avoid travel to zika zones, use of support stockings with flights longer than 3 hours, movement every 2-3 hours if driving  -- Discussed exercise  -- Discussed avoidance of Jacuzzis, saunas, hot tubs and steam rooms  -- Discussed avoidance of alcohol, smoking, and minimizing caffeine  -- Warning signs reviewed. Advised to call office if occur. Safe use of [employer] for messaging  -- Reviewed genetic/chromosomal testing options for pregnancies  -- Evidence that baby ASA reduces risk of preeclampsia,  birth, and IUGR in at risk populations by about 10-20%   --Ultrasound ordered for dating  -- Schedule RN OB ED visit   -- I have spent 25 minutes total time on the day of the encounter, including: preparing  to see the patient, ordering/reviewing labs, performing a medically appropriate exam, and providing care coordination. face to face counseling, chart review, orders and coordination of care    Pt verbalized understanding. All questions answered. No barriers to learning identified        [1] No past medical history on file.  [2]   Past Surgical History:  Procedure Laterality Date    Appendectomy     [3]   Family History  Problem Relation Age of Onset    Diabetes Mother     No Known Problems Maternal Grandmother     No Known Problems Maternal Grandfather     No Known Problems Paternal Grandmother     No Known Problems Paternal Grandfather    [4]   Social History  Socioeconomic History    Marital status:    Tobacco Use    Smoking status: Never     Passive exposure: Never    Smokeless tobacco: Never   Vaping Use    Vaping status: Never Used   Substance and Sexual Activity    Alcohol use: Not Currently    Drug use: Not Currently    Sexual activity: Yes     Partners: Male     Social Drivers of Health     Food Insecurity: Unknown (5/31/2024)    Food Insecurity     Food Insecurity: Patient declined   Transportation Needs: No Transportation Needs (5/31/2024)    Transportation Needs     Lack of Transportation: No     Car Seat: Yes   Stress: No Stress Concern Present (5/31/2024)    Stress     Feeling of Stress : No   Housing Stability: Low Risk  (5/31/2024)    Housing Stability     Housing Instability: No   [5]   Current Outpatient Medications   Medication Sig Dispense Refill    prenatal vitamin with DHA 27-0.8-228 MG Oral Cap Take 1 capsule by mouth daily.     [6] No Known Allergies     no

## 2025-07-07 ENCOUNTER — NURSE ONLY (OUTPATIENT)
Dept: OBGYN CLINIC | Facility: CLINIC | Age: 35
End: 2025-07-07
Payer: COMMERCIAL

## 2025-07-07 VITALS — HEIGHT: 62 IN | BODY MASS INDEX: 25 KG/M2

## 2025-07-07 DIAGNOSIS — Z34.81 ENCOUNTER FOR SUPERVISION OF OTHER NORMAL PREGNANCY IN FIRST TRIMESTER (HCC): Primary | ICD-10-CM

## 2025-07-07 NOTE — PROGRESS NOTES
Pt called today for RN OB Education.   Pt verified name and .     OB History    Para Term  AB Living   5 3 3 0 1 3   SAB IAB Ectopic Multiple Live Births   0 0 0 0 3      # Outcome Date GA Lbr [place]son/2nd Weight Sex Type Anes PTL Lv   5 Current            4 Term 24 37w1d 06:55 / 00:06 5 lb 6.1 oz M NORMAL SPONT None N CORBY      Complications: Variable decelerations, Late decelerations      Name: Fabiano Torres      Apgar1: 8  Apgar5: 9   3 AB 2017           2 Term 13 40w1d  7 lb 8 oz M Vag-Spont   CORBY   1 Term 12 40w2d  9 lb 6 oz F Vag-Spont   CORBY     Hx Prior Abnormal Pap: No  Pap Date: 23  Pap Result Notes: NILM     How did you learn of midwives: Previous delivery with midwives.    Labor preferences: No epidural    Following a special diet:  N/A    LMP: 25    Pre  BMI: 23.41    EPDS score: 0/30    Working PRUDENCIO: 26    Hx of genetic abnormality in family: Denies    Hx of varicella: Pt reports hx of chicken pox in childhood and hx of vaccination.      Consent (if needed): N/A    Sterilization/Contraception: Declines    OUD Screening: Pt. Has answered NO 5P questions and has NO  risk factors.    Pt. Given What pregnant women need to know handout.      SDOH Screening: Low risk    Educational material reviewed with patient: Prenatal care, nutrition, weight gain recommendations, travel, exercise, intercourse, pregnancy changes, safe medications, pregnancy and work, fetal movement, labor and  labor, warning signs, food safety, tdap, cord blood, breastfeeding, circumcision, and Group B strep.   Preferred feeding method - both breastfeeding and formula  Circ - no      Blood transfusion if needed: Consents      PN labs: Orders placed. Pt advised to complete labs prior to new OB appointment.       Optional genetic screening labs were reviewed: Cell FreeDNA, FTS with US, Quad screen MSAFP and CF screening.   [place] order form completed for NIPT testing.  Pt denied carrier screen. Pt to  testing kit and order form from office at their earliest convenience.       [facility] Media Policy: Discussed. Pt verbalized understanding and agreed.       NOB apt:  7/31 VINICIUS  Pt requesting new OB appointment after dating ultrasound on 7/28 to confirm dating of pregnancy

## 2025-07-23 ENCOUNTER — LAB ENCOUNTER (OUTPATIENT)
Dept: LAB | Age: 35
End: 2025-07-23
Attending: ADVANCED PRACTICE MIDWIFE
Payer: COMMERCIAL

## 2025-07-23 DIAGNOSIS — Z34.81 ENCOUNTER FOR SUPERVISION OF OTHER NORMAL PREGNANCY IN FIRST TRIMESTER (HCC): ICD-10-CM

## 2025-07-23 LAB
ANTIBODY SCREEN: NEGATIVE
BASOPHILS # BLD AUTO: 0.02 X10(3) UL (ref 0–0.2)
BASOPHILS NFR BLD AUTO: 0.4 %
DEPRECATED RDW RBC AUTO: 40.3 FL (ref 35.1–46.3)
EOSINOPHIL # BLD AUTO: 0.02 X10(3) UL (ref 0–0.7)
EOSINOPHIL NFR BLD AUTO: 0.4 %
ERYTHROCYTE [DISTWIDTH] IN BLOOD BY AUTOMATED COUNT: 12.1 % (ref 11–15)
EST. AVERAGE GLUCOSE BLD GHB EST-MCNC: 105 MG/DL (ref 68–126)
HBA1C MFR BLD: 5.3 % (ref ?–5.7)
HBV SURFACE AG SER-ACNC: <0.1 [IU]/L
HBV SURFACE AG SERPL QL IA: NONREACTIVE
HCT VFR BLD AUTO: 35.1 % (ref 35–48)
HCV AB SERPL QL IA: NONREACTIVE
HGB BLD-MCNC: 12.2 G/DL (ref 12–16)
IMM GRANULOCYTES # BLD AUTO: 0.01 X10(3) UL (ref 0–1)
IMM GRANULOCYTES NFR BLD: 0.2 %
LYMPHOCYTES # BLD AUTO: 1.07 X10(3) UL (ref 1–4)
LYMPHOCYTES NFR BLD AUTO: 18.8 %
MCH RBC QN AUTO: 31.8 PG (ref 26–34)
MCHC RBC AUTO-ENTMCNC: 34.8 G/DL (ref 31–37)
MCV RBC AUTO: 91.4 FL (ref 80–100)
MONOCYTES # BLD AUTO: 0.33 X10(3) UL (ref 0.1–1)
MONOCYTES NFR BLD AUTO: 5.8 %
NEUTROPHILS # BLD AUTO: 4.23 X10 (3) UL (ref 1.5–7.7)
NEUTROPHILS # BLD AUTO: 4.23 X10(3) UL (ref 1.5–7.7)
NEUTROPHILS NFR BLD AUTO: 74.4 %
PLATELET # BLD AUTO: 302 10(3)UL (ref 150–450)
RBC # BLD AUTO: 3.84 X10(6)UL (ref 3.8–5.3)
RH BLOOD TYPE: POSITIVE
RUBV IGG SER-ACNC: 6 IU/ML (ref 10–?)
T PALLIDUM AB SER QL IA: NONREACTIVE
WBC # BLD AUTO: 5.7 X10(3) UL (ref 4–11)

## 2025-07-23 PROCEDURE — 87389 HIV-1 AG W/HIV-1&-2 AB AG IA: CPT

## 2025-07-23 PROCEDURE — 36415 COLL VENOUS BLD VENIPUNCTURE: CPT

## 2025-07-23 PROCEDURE — 87086 URINE CULTURE/COLONY COUNT: CPT

## 2025-07-23 PROCEDURE — 86900 BLOOD TYPING SEROLOGIC ABO: CPT

## 2025-07-23 PROCEDURE — 86850 RBC ANTIBODY SCREEN: CPT

## 2025-07-23 PROCEDURE — 86780 TREPONEMA PALLIDUM: CPT

## 2025-07-23 PROCEDURE — 85025 COMPLETE CBC W/AUTO DIFF WBC: CPT

## 2025-07-23 PROCEDURE — 83036 HEMOGLOBIN GLYCOSYLATED A1C: CPT

## 2025-07-23 PROCEDURE — 86901 BLOOD TYPING SEROLOGIC RH(D): CPT

## 2025-07-23 PROCEDURE — 86787 VARICELLA-ZOSTER ANTIBODY: CPT

## 2025-07-23 PROCEDURE — 87340 HEPATITIS B SURFACE AG IA: CPT

## 2025-07-23 PROCEDURE — 86803 HEPATITIS C AB TEST: CPT

## 2025-07-23 PROCEDURE — 86762 RUBELLA ANTIBODY: CPT

## 2025-07-24 ENCOUNTER — RESULTS FOLLOW-UP (OUTPATIENT)
Dept: OBGYN CLINIC | Facility: CLINIC | Age: 35
End: 2025-07-24

## 2025-07-24 PROBLEM — Z28.39 RUBELLA NON-IMMUNE STATUS, ANTEPARTUM (HCC): Status: ACTIVE | Noted: 2025-07-24

## 2025-07-24 PROBLEM — O09.899 RUBELLA NON-IMMUNE STATUS, ANTEPARTUM (HCC): Status: ACTIVE | Noted: 2025-07-24

## 2025-07-25 LAB — VZV IGG SER IA-ACNC: 7.86 (ref 1–?)

## 2025-07-28 ENCOUNTER — HOSPITAL ENCOUNTER (OUTPATIENT)
Dept: ULTRASOUND IMAGING | Facility: HOSPITAL | Age: 35
Discharge: HOME | End: 2025-07-28
Attending: ADVANCED PRACTICE MIDWIFE
Payer: COMMERCIAL

## 2025-07-28 DIAGNOSIS — N92.6 IRREGULAR MENSES: ICD-10-CM

## 2025-07-28 PROCEDURE — 76801 OB US < 14 WKS SINGLE FETUS: CPT | Performed by: ADVANCED PRACTICE MIDWIFE

## 2025-07-31 ENCOUNTER — INITIAL PRENATAL (OUTPATIENT)
Dept: OBGYN CLINIC | Facility: CLINIC | Age: 35
End: 2025-07-31
Payer: COMMERCIAL

## 2025-07-31 VITALS
HEART RATE: 71 BPM | DIASTOLIC BLOOD PRESSURE: 69 MMHG | SYSTOLIC BLOOD PRESSURE: 99 MMHG | WEIGHT: 135 LBS | BODY MASS INDEX: 25 KG/M2

## 2025-07-31 DIAGNOSIS — Z34.92 PRENATAL CARE, SECOND TRIMESTER (HCC): Primary | ICD-10-CM

## 2025-07-31 DIAGNOSIS — Z87.59 HISTORY OF PRIOR PREGNANCY WITH SGA NEWBORN: ICD-10-CM

## 2025-07-31 PROBLEM — Z28.21 TETANUS, DIPHTHERIA, AND ACELLULAR PERTUSSIS (TDAP) VACCINATION DECLINED: Status: RESOLVED | Noted: 2024-05-08 | Resolved: 2025-07-31

## (undated) NOTE — LETTER
6/12/2024            To Whom It May Concern:    Yobany Roblero and Mark torres are expecting, babys father Mark Torres 01/13/1993 and yobany 03/09/1992 are being treated at midwifery [facility] for her pregnancy. Anand due date is 08/02/2024. For any further questions please have Yobany contact my office     Sincerely,    Hope Bajwa CNM  [facility]  [address] [place] [id number] [phone]

## (undated) NOTE — LETTER
[facility]  Administration of Anesthesia  IRose agree to be cared for by a physician anesthesiologist alone and/or with a nurse anesthetist, who is specially trained to monitor me and give me medicine to put me to sleep or keep me comfortable during my procedure    I understand that my anesthesiologist and/or anesthetist is not an employee or agent of [facility] or [employer]. He or she works for [facility]    As the patient asking for anesthesia services, I agree to:  Allow the anesthesiologist (anesthesia doctor) to give me medicine and do additional procedures as necessary. Some examples are: Starting or using an “IV” to give me medicine, fluids or blood during my procedure, and having a breathing tube placed to help me breathe when I’m asleep (intubation). In the event that my heart stops working properly, I understand that my anesthesiologist will make every effort to sustain my life, unless otherwise directed by [facility] Do Not Resuscitate documents.  Tell my anesthesia doctor before my procedure:  If I am pregnant.  The last time that I ate or drank.  iii. All of the medicines I take (including prescriptions, herbal supplements, and pills I can buy without a prescription (including street drugs/illegal medications). Failure to inform my anesthesiologist about these medicines may increase my risk of anesthetic complications.  iv.If I am allergic to anything or have had a reaction to anesthesia before.  I understand how the anesthesia medicine will help me (benefits).  I understand that with any type of anesthesia medicine there are risks:  The most common risks are: nausea, vomiting, sore throat, muscle soreness, damage to my eyes, mouth, or teeth (from breathing tube placement).  Rare risks include: remembering what happened during my procedure, allergic reactions to medications, injury to my airway, heart, lungs, vision, nerves, or  muscles and in extremely rare instances death.  My doctor has explained to me other choices available to me for my care (alternatives).  Pregnant Patients (“epidural”):  I understand that the risks of having an epidural (medicine given into my back to help control pain during labor), include itching, low blood pressure, difficulty urinating, headache or slowing of the baby’s heart. Very rare risks include infection, bleeding, seizure, irregular heart rhythms and nerve injury.  Regional Anesthesia (“spinal”, “epidural”, & “nerve blocks”):  I understand that rare but potential complications include headache, bleeding, infection, seizure, irregular heart rhythms, and nerve injury.    _____________________________________________________________________________  Patient (or Representative) Signature/Relationship to Patient  Date   Time    _____________________________________________________________________________   Name (if used)    Language/Organization   Time    _____________________________________________________________________________  Nurse Anesthetist Signature     Date   Time  _____________________________________________________________________________  Anesthesiologist Signature     Date   Time  I have discussed the procedure and information above with the patient (or patient’s representative) and answered their questions. The patient or their representative has agreed to have anesthesia services.    _____________________________________________________________________________  Witness        Date   Time  I have verified that the signature is that of the patient or patient’s representative, and that it was signed before the procedure  Patient Name: Rose Roblero     : 3/9/1990                 Printed: 2024 at 10:41 AM    Medical Record #: [id number]                                            Page 1 of 1  ----------ANESTHESIA CONSENT----------

## (undated) NOTE — LETTER
4/10/2024            To Whom It May Concern:    Rose Roblero  is currently under our care for pregnancy.  It is permissible at her gestational age for dental work to be performed.  However, if x-rays are needed, please make sure that the abdomen is shielded appropriately, and thoroughly.  For any further questions please have Rose contact my office.     Sincerely,    Hope Bajwa CNM  [facility] [place]  [address] [place] [id number] [phone]

## (undated) NOTE — LETTER
AUTHORIZATION FOR SURGICAL OPERATION OR OTHER PROCEDURE    1. I hereby authorize Dr. ZARAGOZA PHYSICIAN:[id number]}, and [facility] staff assigned to my case to perform the following operation and/or procedure at the [facility] site:    _______________________________________________________________________________________________      _______________________________________________________________________________________________    2.  My physician has explained the nature and purpose of the operation or other procedure, possible alternative methods of treatment, the risks involved, and the possibility of complication to me.  I acknowledge that no guarantee has been made as to the result that may be obtained.  3.  I recognize that, during the course of this operation, or other procedure, unforseen conditions may necessitate additional or different procedure than those listed above.  I, therefore, further authorize and request that the above named physician, his/her physician assistants or designees perform such procedures as are, in his/her professional opinion, necessary and desirable.  4.  Any tissue or organs removed in the operation or other procedure may be disposed of by and at the discretion of the [facility] and [facility].  5.  I understand that in the event of a medical emergency, I will be transported by local paramedics to [facility] or other hospital emergency department.  6.  I certify that I have read and fully understand the above consent to operation and/or other procedure.    7.  I acknowledge that my physician has explained sedation/analgesia administration to me including the risks and benefits.  I consent to the administration of sedation/analgesia as may be necessary or desirable in the judgement of my physician.    Witness signature: ___________________________________________________ Date:  ______/______/_____                    Time:   ________ A.M.  P.M.       Patient Name:  ______________________________________________________  (please print)      Patient signature:  ___________________________________________________             Relationship to Patient:           []  Parent    Responsible person                          []  Spouse  In case of minor or                    [] Other  _____________   Incompetent name:  __________________________________________________                               (please print)      _____________      Responsible person  In case of minor or  Incompetent signature:  _______________________________________________    Statement of Physician  My signature below affirms that prior to the time of the procedure, I have explained to the patient and/or his/her guardian, the risks and benefits involved in the proposed treatment and any reasonable alternative to the proposed treatment.  I have also explained the risks and benefits involved in the refusal of the proposed treatment and have answered the patient's questions.                        Date:  ______/______/_______  Provider                      Signature:  __________________________________________________________       Time:  ___________ A.M    P.M.

## (undated) NOTE — LETTER
Dear New MomJoselin, we missed you! The nurses of [facility] have tried to reach you by phone to ask if you have any questions regarding your health or the health and care of your new little one.    We hope you are doing well. If, for any reason, you have questions or concerns about your health or your baby’s health, please contact your provider or your pediatrician or family medicine physician regarding your baby.     At [facility], we feel that postpartum support is very important for new families. Please see the enclosed new parent support flyer that lists support programs and resources with both in-person and online options.     Additionally, our Breastfeeding Centers at [facility] and [facility] in [place], offer outpatient visits with our International Board-Certified Lactation   Consultants (IBCLCs) for any breastfeeding concerns or questions you may have.    For issues related to stress, anxiety or depression, we have a Nurturing Mom support group that meets both in-person or online.  There’s also a 24-hour Mom’s Line where you can request a phone call from a clinical therapist for assistance for postpartum depression.    We encourage you to take advantage of these programs and resources as you recover from childbirth and learn to care for your new infant.    Best wishes,    [facility]            [id number]